# Patient Record
Sex: FEMALE | Race: WHITE | NOT HISPANIC OR LATINO | URBAN - METROPOLITAN AREA
[De-identification: names, ages, dates, MRNs, and addresses within clinical notes are randomized per-mention and may not be internally consistent; named-entity substitution may affect disease eponyms.]

---

## 2017-03-05 ENCOUNTER — EMERGENCY (EMERGENCY)
Facility: HOSPITAL | Age: 52
LOS: 0 days | Discharge: HOME | End: 2017-03-06
Attending: EMERGENCY MEDICINE
Payer: COMMERCIAL

## 2017-03-05 VITALS
TEMPERATURE: 97 F | DIASTOLIC BLOOD PRESSURE: 63 MMHG | SYSTOLIC BLOOD PRESSURE: 110 MMHG | HEIGHT: 64 IN | OXYGEN SATURATION: 100 % | RESPIRATION RATE: 17 BRPM | HEART RATE: 67 BPM | WEIGHT: 160.06 LBS

## 2017-03-05 DIAGNOSIS — S93.602A UNSPECIFIED SPRAIN OF LEFT FOOT, INITIAL ENCOUNTER: ICD-10-CM

## 2017-03-05 DIAGNOSIS — N39.0 URINARY TRACT INFECTION, SITE NOT SPECIFIED: ICD-10-CM
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
## 2017-03-05 DIAGNOSIS — R55 SYNCOPE AND COLLAPSE: ICD-10-CM

## 2017-03-05 DIAGNOSIS — I34.1 NONRHEUMATIC MITRAL (VALVE) PROLAPSE: ICD-10-CM
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
## 2017-03-05 LAB
ALBUMIN SERPL ELPH-MCNC: 4 G/DL — SIGNIFICANT CHANGE UP (ref 3.3–5)
ALP SERPL-CCNC: 47 U/L — SIGNIFICANT CHANGE UP (ref 40–120)
ALT FLD-CCNC: 21 U/L — SIGNIFICANT CHANGE UP (ref 12–78)
ANION GAP SERPL CALC-SCNC: 10 MMOL/L — SIGNIFICANT CHANGE UP (ref 5–17)
APTT BLD: 31.6 SEC — SIGNIFICANT CHANGE UP (ref 27.5–37.4)
AST SERPL-CCNC: 15 U/L — SIGNIFICANT CHANGE UP (ref 15–37)
BASOPHILS # BLD AUTO: 0.1 K/UL — SIGNIFICANT CHANGE UP (ref 0–0.2)
BASOPHILS NFR BLD AUTO: 0.9 % — SIGNIFICANT CHANGE UP (ref 0–2)
BILIRUB SERPL-MCNC: 0.4 MG/DL — SIGNIFICANT CHANGE UP (ref 0.2–1.2)
BUN SERPL-MCNC: 17 MG/DL — SIGNIFICANT CHANGE UP (ref 7–23)
CALCIUM SERPL-MCNC: 8.9 MG/DL — SIGNIFICANT CHANGE UP (ref 8.5–10.1)
CHLORIDE SERPL-SCNC: 102 MMOL/L — SIGNIFICANT CHANGE UP (ref 96–108)
CK MB BLD-MCNC: 1 % — SIGNIFICANT CHANGE UP (ref 0–3.5)
CK MB CFR SERPL CALC: 1.4 NG/ML — SIGNIFICANT CHANGE UP (ref 0.5–3.6)
CK SERPL-CCNC: 145 U/L — SIGNIFICANT CHANGE UP (ref 26–192)
CO2 SERPL-SCNC: 29 MMOL/L — SIGNIFICANT CHANGE UP (ref 22–31)
CREAT SERPL-MCNC: 0.84 MG/DL — SIGNIFICANT CHANGE UP (ref 0.5–1.3)
EOSINOPHIL # BLD AUTO: 0.1 K/UL — SIGNIFICANT CHANGE UP (ref 0–0.5)
EOSINOPHIL NFR BLD AUTO: 1 % — SIGNIFICANT CHANGE UP (ref 0–6)
GLUCOSE SERPL-MCNC: 102 MG/DL — HIGH (ref 70–99)
HCT VFR BLD CALC: 37.7 % — SIGNIFICANT CHANGE UP (ref 34.5–45)
HGB BLD-MCNC: 13.3 G/DL — SIGNIFICANT CHANGE UP (ref 11.5–15.5)
INR BLD: 1.11 RATIO — SIGNIFICANT CHANGE UP (ref 0.88–1.16)
LYMPHOCYTES # BLD AUTO: 3.5 K/UL — HIGH (ref 1–3.3)
LYMPHOCYTES # BLD AUTO: 38.5 % — SIGNIFICANT CHANGE UP (ref 13–44)
MCHC RBC-ENTMCNC: 30.1 PG — SIGNIFICANT CHANGE UP (ref 27–34)
MCHC RBC-ENTMCNC: 35.2 GM/DL — SIGNIFICANT CHANGE UP (ref 32–36)
MCV RBC AUTO: 85.5 FL — SIGNIFICANT CHANGE UP (ref 80–100)
MONOCYTES # BLD AUTO: 0.7 K/UL — SIGNIFICANT CHANGE UP (ref 0–0.9)
MONOCYTES NFR BLD AUTO: 7.7 % — SIGNIFICANT CHANGE UP (ref 2–14)
NEUTROPHILS # BLD AUTO: 4.7 K/UL — SIGNIFICANT CHANGE UP (ref 1.8–7.4)
NEUTROPHILS NFR BLD AUTO: 51.9 % — SIGNIFICANT CHANGE UP (ref 43–77)
PLATELET # BLD AUTO: 209 K/UL — SIGNIFICANT CHANGE UP (ref 150–400)
POTASSIUM SERPL-MCNC: 3.3 MMOL/L — LOW (ref 3.5–5.3)
POTASSIUM SERPL-SCNC: 3.3 MMOL/L — LOW (ref 3.5–5.3)
PROT SERPL-MCNC: 7.2 GM/DL — SIGNIFICANT CHANGE UP (ref 6–8.3)
PROTHROM AB SERPL-ACNC: 12.5 SEC — SIGNIFICANT CHANGE UP (ref 10–13.1)
RBC # BLD: 4.41 M/UL — SIGNIFICANT CHANGE UP (ref 3.8–5.2)
RBC # FLD: 11.1 % — SIGNIFICANT CHANGE UP (ref 11–15)
SODIUM SERPL-SCNC: 141 MMOL/L — SIGNIFICANT CHANGE UP (ref 135–145)
TROPONIN I SERPL-MCNC: <.015 NG/ML — SIGNIFICANT CHANGE UP (ref 0.01–0.04)
WBC # BLD: 9.1 K/UL — SIGNIFICANT CHANGE UP (ref 3.8–10.5)
WBC # FLD AUTO: 9.1 K/UL — SIGNIFICANT CHANGE UP (ref 3.8–10.5)

## 2017-03-05 PROCEDURE — 99285 EMERGENCY DEPT VISIT HI MDM: CPT | Mod: 25

## 2017-03-05 PROCEDURE — 73630 X-RAY EXAM OF FOOT: CPT | Mod: 26,LT

## 2017-03-05 RX ORDER — ONDANSETRON 8 MG/1
4 TABLET, FILM COATED ORAL ONCE
Qty: 0 | Refills: 0 | Status: COMPLETED | OUTPATIENT
Start: 2017-03-05 | End: 2017-03-05

## 2017-03-05 RX ORDER — SODIUM CHLORIDE 9 MG/ML
1000 INJECTION INTRAMUSCULAR; INTRAVENOUS; SUBCUTANEOUS ONCE
Qty: 0 | Refills: 0 | Status: COMPLETED | OUTPATIENT
Start: 2017-03-05 | End: 2017-03-05

## 2017-03-05 RX ADMIN — SODIUM CHLORIDE 1000 MILLILITER(S): 9 INJECTION INTRAMUSCULAR; INTRAVENOUS; SUBCUTANEOUS at 23:14

## 2017-03-05 RX ADMIN — ONDANSETRON 4 MILLIGRAM(S): 8 TABLET, FILM COATED ORAL at 23:14

## 2017-03-05 NOTE — ED PROVIDER NOTE - PHYSICAL EXAMINATION
Gen: Alert, NAD, speaking in complete sentences;  Head: NC, AT, PERRL, EOMI, normal lids/conjunctiva, no nystagmus;  ENT: normal hearing, patent oropharynx, MMM;  Neck: supple, no tenderness/meningismus, FROM;  Pulm: Bilateral clear BS, normal resp effort, no wheeze/stridor/retractions;  CV: RRR, +murmur, +dist pulses;  Abd: soft, NT/ND, +BS, no guarding/rebound tenderness;  Mskel: no edema/erythema/cyanosis, motor 5/5 and equal in upper & lower ext bilaterally, sensation intact, L foot TTP over dorsal aspect proximal to 3rd-5th digits, FROM in toes/foot/ankle, no swelling, DP+2, sensation intact;  Skin: no rash;  Neuro: AAOx3, no sensory/motor deficits, CN 2-12 intact

## 2017-03-05 NOTE — ED PROVIDER NOTE - CARE PLAN
Principal Discharge DX:	Syncope  Secondary Diagnosis:	Foot sprain  Secondary Diagnosis:	UTI (urinary tract infection)

## 2017-03-05 NOTE — ED PROVIDER NOTE - MEDICAL DECISION MAKING DETAILS
Pt VSS in NAD.  Pt reports sx improved after receiving IVF.  Imaging & labs neg for acute pathology.  Discussed results and outcome of testing with the patient, given copy as well.  Patient advised to please follow up with their primary care doctor within the next 24 hours and return to the Emergency Department for worsening symptoms or any other concerns.  Patient advised that their doctor may call  to follow up on the specific results of the tests performed today in the emergency department.

## 2017-03-05 NOTE — ED ADULT NURSE NOTE - OBJECTIVE STATEMENT
pt [place] ED [place] at airport and left ankle injury, pt states people witnessed here and though she might have been having a seizure

## 2017-03-05 NOTE — ED PROVIDER NOTE - OBJECTIVE STATEMENT
Pertinent PMH/PSH/FHx/SHx and Review of Systems contained within:    50yo F w PMH of mitral valve prolapse, uterine fibroids s/p myomectomy & tubal ligation, prev L foot fx, orthostasis, former smoker currently on nicotine patches & gum presents to ED for eval of dizziness & s/p episode of syncope.  Pt states she has also been dieting, and only had an apple earlier.  Pt states she was in airport, waiting to board plane when she stood up and injured L foot.  Pt states she "heard it crack a few times."  Pt then felt lightheaded, and dizzy.  Pt sat in chair, told co-worker she felt like she was going to pass out, then fainted in chair.  Co-worker reported ?tonic-clonic activity, no incontinence.  Pt denies CP, SOB, abd pain.  +nausea, no vomiting.     No fever/chills, No photophobia/eye pain/changes in vision, No ear pain/sore throat/dysphagia, No chest pain/palpitations, no SOB/cough/wheeze/stridor, no dysuria/frequency/discharge, No neck/back pain, no rash

## 2017-03-06 VITALS
OXYGEN SATURATION: 99 % | TEMPERATURE: 97 F | RESPIRATION RATE: 17 BRPM | HEART RATE: 62 BPM | DIASTOLIC BLOOD PRESSURE: 36 MMHG | SYSTOLIC BLOOD PRESSURE: 109 MMHG

## 2017-03-06 LAB
APPEARANCE UR: CLEAR — SIGNIFICANT CHANGE UP
BACTERIA # UR AUTO: ABNORMAL
BILIRUB UR-MCNC: NEGATIVE — SIGNIFICANT CHANGE UP
CK MB BLD-MCNC: 1 % — SIGNIFICANT CHANGE UP (ref 0–3.5)
CK MB CFR SERPL CALC: 1.2 NG/ML — SIGNIFICANT CHANGE UP (ref 0.5–3.6)
CK SERPL-CCNC: 122 U/L — SIGNIFICANT CHANGE UP (ref 26–192)
COLOR SPEC: YELLOW — SIGNIFICANT CHANGE UP
COMMENT - URINE: SIGNIFICANT CHANGE UP
DIFF PNL FLD: ABNORMAL
EPI CELLS # UR: SIGNIFICANT CHANGE UP
GLUCOSE UR QL: NEGATIVE MG/DL — SIGNIFICANT CHANGE UP
KETONES UR-MCNC: ABNORMAL
LEUKOCYTE ESTERASE UR-ACNC: ABNORMAL
NITRITE UR-MCNC: NEGATIVE — SIGNIFICANT CHANGE UP
PH UR: 5 — SIGNIFICANT CHANGE UP (ref 4.8–8)
PROT UR-MCNC: 30 MG/DL
RBC CASTS # UR COMP ASSIST: SIGNIFICANT CHANGE UP /HPF (ref 0–4)
SP GR SPEC: 1.02 — SIGNIFICANT CHANGE UP (ref 1.01–1.02)
TROPONIN I SERPL-MCNC: <.015 NG/ML — SIGNIFICANT CHANGE UP (ref 0.01–0.04)
UROBILINOGEN FLD QL: NEGATIVE MG/DL — SIGNIFICANT CHANGE UP
WBC UR QL: SIGNIFICANT CHANGE UP

## 2017-03-06 PROCEDURE — 70450 CT HEAD/BRAIN W/O DYE: CPT | Mod: 26

## 2017-03-06 RX ORDER — NITROFURANTOIN MACROCRYSTAL 50 MG
1 CAPSULE ORAL
Qty: 20 | Refills: 0 | OUTPATIENT
Start: 2017-03-06 | End: 2017-03-16

## 2017-03-06 RX ORDER — POTASSIUM CHLORIDE 20 MEQ
20 PACKET (EA) ORAL ONCE
Qty: 0 | Refills: 0 | Status: COMPLETED | OUTPATIENT
Start: 2017-03-06 | End: 2017-03-06

## 2017-03-06 RX ADMIN — Medication 20 MILLIEQUIVALENT(S): at 01:05

## 2017-06-26 ENCOUNTER — APPOINTMENT (OUTPATIENT)
Dept: ULTRASOUND IMAGING | Facility: IMAGING CENTER | Age: 52
End: 2017-06-26

## 2017-06-26 ENCOUNTER — OUTPATIENT (OUTPATIENT)
Dept: OUTPATIENT SERVICES | Facility: HOSPITAL | Age: 52
LOS: 1 days | End: 2017-06-26
Payer: COMMERCIAL

## 2017-06-26 DIAGNOSIS — Z00.8 ENCOUNTER FOR OTHER GENERAL EXAMINATION: ICD-10-CM

## 2017-06-26 PROCEDURE — 76830 TRANSVAGINAL US NON-OB: CPT

## 2017-06-26 PROCEDURE — 76856 US EXAM PELVIC COMPLETE: CPT

## 2017-07-01 ENCOUNTER — APPOINTMENT (OUTPATIENT)
Dept: MAMMOGRAPHY | Facility: IMAGING CENTER | Age: 52
End: 2017-07-01

## 2017-12-27 ENCOUNTER — OUTPATIENT (OUTPATIENT)
Dept: OUTPATIENT SERVICES | Facility: HOSPITAL | Age: 52
LOS: 1 days | End: 2017-12-27
Payer: COMMERCIAL

## 2017-12-27 ENCOUNTER — APPOINTMENT (OUTPATIENT)
Dept: MAMMOGRAPHY | Facility: CLINIC | Age: 52
End: 2017-12-27
Payer: COMMERCIAL

## 2017-12-27 DIAGNOSIS — Z00.8 ENCOUNTER FOR OTHER GENERAL EXAMINATION: ICD-10-CM

## 2017-12-27 PROCEDURE — G0202: CPT | Mod: 26

## 2017-12-27 PROCEDURE — 77063 BREAST TOMOSYNTHESIS BI: CPT | Mod: 26

## 2017-12-27 PROCEDURE — 77067 SCR MAMMO BI INCL CAD: CPT

## 2017-12-27 PROCEDURE — 77063 BREAST TOMOSYNTHESIS BI: CPT

## 2018-02-21 ENCOUNTER — OUTPATIENT (OUTPATIENT)
Dept: OUTPATIENT SERVICES | Facility: HOSPITAL | Age: 53
LOS: 1 days | End: 2018-02-21
Payer: COMMERCIAL

## 2018-02-21 ENCOUNTER — APPOINTMENT (OUTPATIENT)
Dept: CT IMAGING | Facility: CLINIC | Age: 53
End: 2018-02-21
Payer: COMMERCIAL

## 2018-02-21 DIAGNOSIS — Z00.8 ENCOUNTER FOR OTHER GENERAL EXAMINATION: ICD-10-CM

## 2018-02-21 PROCEDURE — 74177 CT ABD & PELVIS W/CONTRAST: CPT | Mod: 26

## 2018-02-21 PROCEDURE — 74177 CT ABD & PELVIS W/CONTRAST: CPT

## 2018-11-13 PROBLEM — R55 SYNCOPE AND COLLAPSE: Chronic | Status: ACTIVE | Noted: 2017-03-05

## 2018-11-13 PROBLEM — I34.1 NONRHEUMATIC MITRAL (VALVE) PROLAPSE: Chronic | Status: ACTIVE | Noted: 2017-03-05

## 2018-11-16 ENCOUNTER — OUTPATIENT (OUTPATIENT)
Dept: OUTPATIENT SERVICES | Facility: HOSPITAL | Age: 53
LOS: 1 days | End: 2018-11-16
Payer: COMMERCIAL

## 2018-11-16 ENCOUNTER — APPOINTMENT (OUTPATIENT)
Dept: RADIOLOGY | Facility: CLINIC | Age: 53
End: 2018-11-16
Payer: COMMERCIAL

## 2018-11-16 DIAGNOSIS — Z12.31 ENCOUNTER FOR SCREENING MAMMOGRAM FOR MALIGNANT NEOPLASM OF BREAST: ICD-10-CM

## 2018-11-16 DIAGNOSIS — Z00.8 ENCOUNTER FOR OTHER GENERAL EXAMINATION: ICD-10-CM

## 2018-11-16 PROCEDURE — 77080 DXA BONE DENSITY AXIAL: CPT | Mod: 26

## 2018-11-16 PROCEDURE — 77080 DXA BONE DENSITY AXIAL: CPT

## 2019-01-03 ENCOUNTER — OUTPATIENT (OUTPATIENT)
Dept: OUTPATIENT SERVICES | Facility: HOSPITAL | Age: 54
LOS: 1 days | End: 2019-01-03
Payer: COMMERCIAL

## 2019-01-03 ENCOUNTER — APPOINTMENT (OUTPATIENT)
Dept: MAMMOGRAPHY | Facility: CLINIC | Age: 54
End: 2019-01-03
Payer: COMMERCIAL

## 2019-01-03 DIAGNOSIS — Z00.8 ENCOUNTER FOR OTHER GENERAL EXAMINATION: ICD-10-CM

## 2019-01-03 PROCEDURE — 77067 SCR MAMMO BI INCL CAD: CPT

## 2019-01-03 PROCEDURE — 77063 BREAST TOMOSYNTHESIS BI: CPT | Mod: 26

## 2019-01-03 PROCEDURE — 77063 BREAST TOMOSYNTHESIS BI: CPT

## 2019-01-03 PROCEDURE — 77067 SCR MAMMO BI INCL CAD: CPT | Mod: 26

## 2019-04-27 ENCOUNTER — EMERGENCY (EMERGENCY)
Facility: HOSPITAL | Age: 54
LOS: 0 days | Discharge: ANOTHER HOSPITAL | End: 2019-04-28
Attending: EMERGENCY MEDICINE
Payer: COMMERCIAL

## 2019-04-27 VITALS
HEART RATE: 54 BPM | HEIGHT: 64 IN | RESPIRATION RATE: 17 BRPM | SYSTOLIC BLOOD PRESSURE: 122 MMHG | TEMPERATURE: 98 F | WEIGHT: 154.1 LBS | OXYGEN SATURATION: 100 % | DIASTOLIC BLOOD PRESSURE: 74 MMHG

## 2019-04-27 DIAGNOSIS — R42 DIZZINESS AND GIDDINESS: ICD-10-CM

## 2019-04-27 DIAGNOSIS — R00.1 BRADYCARDIA, UNSPECIFIED: ICD-10-CM

## 2019-04-27 LAB
ALBUMIN SERPL ELPH-MCNC: 3.5 G/DL — SIGNIFICANT CHANGE UP (ref 3.3–5)
ALP SERPL-CCNC: 54 U/L — SIGNIFICANT CHANGE UP (ref 40–120)
ALT FLD-CCNC: 25 U/L — SIGNIFICANT CHANGE UP (ref 12–78)
ANION GAP SERPL CALC-SCNC: 8 MMOL/L — SIGNIFICANT CHANGE UP (ref 5–17)
APPEARANCE UR: CLEAR — SIGNIFICANT CHANGE UP
APTT BLD: 36.1 SEC — SIGNIFICANT CHANGE UP (ref 27.5–36.3)
AST SERPL-CCNC: 13 U/L — LOW (ref 15–37)
BACTERIA # UR AUTO: ABNORMAL
BILIRUB SERPL-MCNC: 0.3 MG/DL — SIGNIFICANT CHANGE UP (ref 0.2–1.2)
BILIRUB UR-MCNC: NEGATIVE — SIGNIFICANT CHANGE UP
BUN SERPL-MCNC: 15 MG/DL — SIGNIFICANT CHANGE UP (ref 7–23)
CALCIUM SERPL-MCNC: 9 MG/DL — SIGNIFICANT CHANGE UP (ref 8.5–10.1)
CHLORIDE SERPL-SCNC: 104 MMOL/L — SIGNIFICANT CHANGE UP (ref 96–108)
CO2 SERPL-SCNC: 28 MMOL/L — SIGNIFICANT CHANGE UP (ref 22–31)
COD CRY URNS QL: ABNORMAL
COLOR SPEC: YELLOW — SIGNIFICANT CHANGE UP
CREAT SERPL-MCNC: 0.71 MG/DL — SIGNIFICANT CHANGE UP (ref 0.5–1.3)
DIFF PNL FLD: ABNORMAL
EPI CELLS # UR: ABNORMAL
GLUCOSE BLDC GLUCOMTR-MCNC: 80 MG/DL — SIGNIFICANT CHANGE UP (ref 70–99)
GLUCOSE SERPL-MCNC: 89 MG/DL — SIGNIFICANT CHANGE UP (ref 70–99)
GLUCOSE UR QL: NEGATIVE MG/DL — SIGNIFICANT CHANGE UP
HCG SERPL-ACNC: 2 MIU/ML — SIGNIFICANT CHANGE UP
HCT VFR BLD CALC: 38.7 % — SIGNIFICANT CHANGE UP (ref 34.5–45)
HGB BLD-MCNC: 13 G/DL — SIGNIFICANT CHANGE UP (ref 11.5–15.5)
INR BLD: 0.96 RATIO — SIGNIFICANT CHANGE UP (ref 0.88–1.16)
KETONES UR-MCNC: NEGATIVE — SIGNIFICANT CHANGE UP
LEUKOCYTE ESTERASE UR-ACNC: ABNORMAL
LIDOCAIN IGE QN: 202 U/L — SIGNIFICANT CHANGE UP (ref 73–393)
MCHC RBC-ENTMCNC: 29.9 PG — SIGNIFICANT CHANGE UP (ref 27–34)
MCHC RBC-ENTMCNC: 33.6 GM/DL — SIGNIFICANT CHANGE UP (ref 32–36)
MCV RBC AUTO: 89 FL — SIGNIFICANT CHANGE UP (ref 80–100)
NITRITE UR-MCNC: NEGATIVE — SIGNIFICANT CHANGE UP
NRBC # BLD: 0 /100 WBCS — SIGNIFICANT CHANGE UP (ref 0–0)
NT-PROBNP SERPL-SCNC: 53 PG/ML — SIGNIFICANT CHANGE UP (ref 0–125)
PH UR: 5 — SIGNIFICANT CHANGE UP (ref 5–8)
PLATELET # BLD AUTO: 203 K/UL — SIGNIFICANT CHANGE UP (ref 150–400)
POTASSIUM SERPL-MCNC: 3.6 MMOL/L — SIGNIFICANT CHANGE UP (ref 3.5–5.3)
POTASSIUM SERPL-SCNC: 3.6 MMOL/L — SIGNIFICANT CHANGE UP (ref 3.5–5.3)
PROT SERPL-MCNC: 6.6 GM/DL — SIGNIFICANT CHANGE UP (ref 6–8.3)
PROT UR-MCNC: NEGATIVE MG/DL — SIGNIFICANT CHANGE UP
PROTHROM AB SERPL-ACNC: 10.7 SEC — SIGNIFICANT CHANGE UP (ref 10–12.9)
RBC # BLD: 4.35 M/UL — SIGNIFICANT CHANGE UP (ref 3.8–5.2)
RBC # FLD: 12.9 % — SIGNIFICANT CHANGE UP (ref 10.3–14.5)
RBC CASTS # UR COMP ASSIST: SIGNIFICANT CHANGE UP /HPF (ref 0–4)
SODIUM SERPL-SCNC: 140 MMOL/L — SIGNIFICANT CHANGE UP (ref 135–145)
SP GR SPEC: 1.02 — SIGNIFICANT CHANGE UP (ref 1.01–1.02)
TROPONIN I SERPL-MCNC: <.015 NG/ML — SIGNIFICANT CHANGE UP (ref 0.01–0.04)
UROBILINOGEN FLD QL: NEGATIVE MG/DL — SIGNIFICANT CHANGE UP
WBC # BLD: 7.53 K/UL — SIGNIFICANT CHANGE UP (ref 3.8–10.5)
WBC # FLD AUTO: 7.53 K/UL — SIGNIFICANT CHANGE UP (ref 3.8–10.5)
WBC UR QL: SIGNIFICANT CHANGE UP

## 2019-04-27 PROCEDURE — 71045 X-RAY EXAM CHEST 1 VIEW: CPT | Mod: 26

## 2019-04-27 PROCEDURE — 99285 EMERGENCY DEPT VISIT HI MDM: CPT

## 2019-04-27 PROCEDURE — 93010 ELECTROCARDIOGRAM REPORT: CPT

## 2019-04-27 PROCEDURE — 70450 CT HEAD/BRAIN W/O DYE: CPT | Mod: 26

## 2019-04-27 RX ORDER — MECLIZINE HCL 12.5 MG
25 TABLET ORAL ONCE
Qty: 0 | Refills: 0 | Status: COMPLETED | OUTPATIENT
Start: 2019-04-27 | End: 2019-04-27

## 2019-04-27 RX ORDER — SODIUM CHLORIDE 9 MG/ML
1000 INJECTION INTRAMUSCULAR; INTRAVENOUS; SUBCUTANEOUS ONCE
Qty: 0 | Refills: 0 | Status: COMPLETED | OUTPATIENT
Start: 2019-04-27 | End: 2019-04-27

## 2019-04-27 RX ADMIN — SODIUM CHLORIDE 1000 MILLILITER(S): 9 INJECTION INTRAMUSCULAR; INTRAVENOUS; SUBCUTANEOUS at 21:10

## 2019-04-27 RX ADMIN — Medication 25 MILLIGRAM(S): at 21:10

## 2019-04-27 NOTE — ED PROVIDER NOTE - NSRISKOFTRANSFER_ED_A_ED
Deterioration of Condition En Route/Transportation Risk (There is always a risk of traffic delays resulting in deterioration of condition.)/Death or Disability

## 2019-04-27 NOTE — ED PROVIDER NOTE - CLINICAL SUMMARY MEDICAL DECISION MAKING FREE TEXT BOX
Ddx: Symptomatic bradycardia/ vertigo, but negative lucía hallpike/ near syncope  Plan: Cbc, cmp, lipase, bnp, cxr, troponin, ct head, reassess

## 2019-04-27 NOTE — ED ADULT NURSE NOTE - NSIMPLEMENTINTERV_GEN_ALL_ED
Implemented All Universal Safety Interventions:  [place] to call system. Call bell, personal items and telephone within reach. Instruct patient to call for assistance. Room bathroom lighting operational. Non-slip footwear when patient is off stretcher. Physically safe environment: no spills, clutter or unnecessary equipment. Stretcher in lowest position, wheels locked, appropriate side rails in place.

## 2019-04-27 NOTE — ED PROVIDER NOTE - OBJECTIVE STATEMENT
Pt is a 52 yo lady with a pmhx of mitral valve prolapse who presents to the ED with near syncope. This started yesterday. Gets light headed, and feels like she may pass out and then it resolves. No nausea. Intermittently says she feels spinning sensation. No viral symptoms recently. No chest pain, no sob, no n/v/d.

## 2019-04-27 NOTE — ED PROVIDER NOTE - CRANIAL NERVE AND PUPILLARY EXAM
cranial nerves 2-12 intact/no dysmetria, normal finger to nose. No nystagmus, negative Moose Hallpike

## 2019-04-27 NOTE — ED ADULT NURSE REASSESSMENT NOTE - NS ED NURSE REASSESS COMMENT FT1
pt resting in bed. witnessed pt eat food. pt complained of onset of dizziness, with heartrate of 53. Dr. Cade made aware. family at bedside

## 2019-04-27 NOTE — ED PROVIDER NOTE - PROGRESS NOTE DETAILS
Pt gets symptomatic bradycardic. Lasts for 20 seconds, and then resolves. On pacer pads. Cardiology consult accepts for transfer.

## 2019-04-28 ENCOUNTER — INPATIENT (INPATIENT)
Facility: HOSPITAL | Age: 54
LOS: 2 days | Discharge: HOME | DRG: 287 | End: 2019-05-01
Attending: STUDENT IN AN ORGANIZED HEALTH CARE EDUCATION/TRAINING PROGRAM | Admitting: INTERNAL MEDICINE
Payer: COMMERCIAL

## 2019-04-28 VITALS
RESPIRATION RATE: 18 BRPM | SYSTOLIC BLOOD PRESSURE: 111 MMHG | TEMPERATURE: 98 F | DIASTOLIC BLOOD PRESSURE: 72 MMHG | WEIGHT: 154.98 LBS | HEIGHT: 64 IN | HEART RATE: 52 BPM

## 2019-04-28 VITALS
HEART RATE: 64 BPM | RESPIRATION RATE: 17 BRPM | SYSTOLIC BLOOD PRESSURE: 121 MMHG | DIASTOLIC BLOOD PRESSURE: 73 MMHG | OXYGEN SATURATION: 97 % | TEMPERATURE: 98 F

## 2019-04-28 DIAGNOSIS — R00.1 BRADYCARDIA, UNSPECIFIED: ICD-10-CM

## 2019-04-28 DIAGNOSIS — F17.200 NICOTINE DEPENDENCE, UNSPECIFIED, UNCOMPLICATED: ICD-10-CM

## 2019-04-28 DIAGNOSIS — Z29.9 ENCOUNTER FOR PROPHYLACTIC MEASURES, UNSPECIFIED: ICD-10-CM

## 2019-04-28 DIAGNOSIS — Z90.89 ACQUIRED ABSENCE OF OTHER ORGANS: Chronic | ICD-10-CM

## 2019-04-28 DIAGNOSIS — Z98.51 TUBAL LIGATION STATUS: Chronic | ICD-10-CM

## 2019-04-28 LAB
ALBUMIN SERPL ELPH-MCNC: 3.8 G/DL — SIGNIFICANT CHANGE UP (ref 3.3–5)
ALP SERPL-CCNC: 51 U/L — SIGNIFICANT CHANGE UP (ref 40–120)
ALT FLD-CCNC: 18 U/L — SIGNIFICANT CHANGE UP (ref 10–45)
ANION GAP SERPL CALC-SCNC: 11 MMOL/L — SIGNIFICANT CHANGE UP (ref 5–17)
APTT BLD: 32.7 SEC — SIGNIFICANT CHANGE UP (ref 27.5–36.3)
AST SERPL-CCNC: 14 U/L — SIGNIFICANT CHANGE UP (ref 10–40)
BASOPHILS # BLD AUTO: 0.1 K/UL — SIGNIFICANT CHANGE UP (ref 0–0.2)
BASOPHILS NFR BLD AUTO: 0.8 % — SIGNIFICANT CHANGE UP (ref 0–2)
BILIRUB SERPL-MCNC: 0.5 MG/DL — SIGNIFICANT CHANGE UP (ref 0.2–1.2)
BLD GP AB SCN SERPL QL: NEGATIVE — SIGNIFICANT CHANGE UP
BUN SERPL-MCNC: 10 MG/DL — SIGNIFICANT CHANGE UP (ref 7–23)
CALCIUM SERPL-MCNC: 9.2 MG/DL — SIGNIFICANT CHANGE UP (ref 8.4–10.5)
CHLORIDE SERPL-SCNC: 108 MMOL/L — SIGNIFICANT CHANGE UP (ref 96–108)
CHOLEST SERPL-MCNC: 185 MG/DL — SIGNIFICANT CHANGE UP (ref 10–199)
CK MB CFR SERPL CALC: 1.5 NG/ML — SIGNIFICANT CHANGE UP (ref 0–3.8)
CO2 SERPL-SCNC: 24 MMOL/L — SIGNIFICANT CHANGE UP (ref 22–31)
CORTIS AM PEAK SERPL-MCNC: 6.4 UG/DL — SIGNIFICANT CHANGE UP (ref 6–18.4)
CREAT SERPL-MCNC: 0.61 MG/DL — SIGNIFICANT CHANGE UP (ref 0.5–1.3)
EOSINOPHIL # BLD AUTO: 0.1 K/UL — SIGNIFICANT CHANGE UP (ref 0–0.5)
EOSINOPHIL NFR BLD AUTO: 2.2 % — SIGNIFICANT CHANGE UP (ref 0–6)
GLUCOSE SERPL-MCNC: 97 MG/DL — SIGNIFICANT CHANGE UP (ref 70–99)
HBA1C BLD-MCNC: 5.4 % — SIGNIFICANT CHANGE UP (ref 4–5.6)
HCG UR QL: NEGATIVE — SIGNIFICANT CHANGE UP
HCT VFR BLD CALC: 35.6 % — SIGNIFICANT CHANGE UP (ref 34.5–45)
HDLC SERPL-MCNC: 53 MG/DL — SIGNIFICANT CHANGE UP
HGB BLD-MCNC: 12.8 G/DL — SIGNIFICANT CHANGE UP (ref 11.5–15.5)
INR BLD: 1.01 RATIO — SIGNIFICANT CHANGE UP (ref 0.88–1.16)
LIPID PNL WITH DIRECT LDL SERPL: 114 MG/DL — HIGH
LYMPHOCYTES # BLD AUTO: 2.7 K/UL — SIGNIFICANT CHANGE UP (ref 1–3.3)
LYMPHOCYTES # BLD AUTO: 42.9 % — SIGNIFICANT CHANGE UP (ref 13–44)
MCHC RBC-ENTMCNC: 32.4 PG — SIGNIFICANT CHANGE UP (ref 27–34)
MCHC RBC-ENTMCNC: 36.1 GM/DL — HIGH (ref 32–36)
MCV RBC AUTO: 89.8 FL — SIGNIFICANT CHANGE UP (ref 80–100)
MONOCYTES # BLD AUTO: 0.5 K/UL — SIGNIFICANT CHANGE UP (ref 0–0.9)
MONOCYTES NFR BLD AUTO: 8.1 % — SIGNIFICANT CHANGE UP (ref 2–14)
NEUTROPHILS # BLD AUTO: 2.9 K/UL — SIGNIFICANT CHANGE UP (ref 1.8–7.4)
NEUTROPHILS NFR BLD AUTO: 46 % — SIGNIFICANT CHANGE UP (ref 43–77)
PLATELET # BLD AUTO: 177 K/UL — SIGNIFICANT CHANGE UP (ref 150–400)
POTASSIUM SERPL-MCNC: 3.8 MMOL/L — SIGNIFICANT CHANGE UP (ref 3.5–5.3)
POTASSIUM SERPL-SCNC: 3.8 MMOL/L — SIGNIFICANT CHANGE UP (ref 3.5–5.3)
PROT SERPL-MCNC: 6.1 G/DL — SIGNIFICANT CHANGE UP (ref 6–8.3)
PROTHROM AB SERPL-ACNC: 11.5 SEC — SIGNIFICANT CHANGE UP (ref 10–12.9)
RBC # BLD: 3.97 M/UL — SIGNIFICANT CHANGE UP (ref 3.8–5.2)
RBC # FLD: 12 % — SIGNIFICANT CHANGE UP (ref 10.3–14.5)
RH IG SCN BLD-IMP: POSITIVE — SIGNIFICANT CHANGE UP
SODIUM SERPL-SCNC: 143 MMOL/L — SIGNIFICANT CHANGE UP (ref 135–145)
TOTAL CHOLESTEROL/HDL RATIO MEASUREMENT: 3.5 RATIO — SIGNIFICANT CHANGE UP (ref 3.3–7.1)
TRIGL SERPL-MCNC: 89 MG/DL — SIGNIFICANT CHANGE UP (ref 10–149)
TROPONIN T, HIGH SENSITIVITY RESULT: <6 NG/L — SIGNIFICANT CHANGE UP (ref 0–51)
TSH SERPL-MCNC: 2.56 UIU/ML — SIGNIFICANT CHANGE UP (ref 0.27–4.2)
TSH SERPL-MCNC: 4.55 UIU/ML — HIGH (ref 0.27–4.2)
WBC # BLD: 6.2 K/UL — SIGNIFICANT CHANGE UP (ref 3.8–10.5)
WBC # FLD AUTO: 6.2 K/UL — SIGNIFICANT CHANGE UP (ref 3.8–10.5)

## 2019-04-28 PROCEDURE — 99291 CRITICAL CARE FIRST HOUR: CPT

## 2019-04-28 PROCEDURE — 99223 1ST HOSP IP/OBS HIGH 75: CPT | Mod: GC

## 2019-04-28 PROCEDURE — 93306 TTE W/DOPPLER COMPLETE: CPT | Mod: 26

## 2019-04-28 PROCEDURE — 93010 ELECTROCARDIOGRAM REPORT: CPT | Mod: 76

## 2019-04-28 RX ORDER — FLUTICASONE PROPIONATE 50 MCG
1 SPRAY, SUSPENSION NASAL DAILY
Qty: 0 | Refills: 0 | Status: DISCONTINUED | OUTPATIENT
Start: 2019-04-28 | End: 2019-05-01

## 2019-04-28 RX ORDER — FLUTICASONE PROPIONATE 50 MCG
1 SPRAY, SUSPENSION NASAL
Qty: 0 | Refills: 0 | COMMUNITY

## 2019-04-28 RX ORDER — CHLORHEXIDINE GLUCONATE 213 G/1000ML
1 SOLUTION TOPICAL
Qty: 0 | Refills: 0 | Status: DISCONTINUED | OUTPATIENT
Start: 2019-04-28 | End: 2019-05-01

## 2019-04-28 RX ORDER — HEPARIN SODIUM 5000 [USP'U]/ML
5000 INJECTION INTRAVENOUS; SUBCUTANEOUS EVERY 8 HOURS
Qty: 0 | Refills: 0 | Status: DISCONTINUED | OUTPATIENT
Start: 2019-04-28 | End: 2019-04-30

## 2019-04-28 RX ORDER — HEPARIN SODIUM 5000 [USP'U]/ML
5000 INJECTION INTRAVENOUS; SUBCUTANEOUS EVERY 8 HOURS
Qty: 0 | Refills: 0 | Status: DISCONTINUED | OUTPATIENT
Start: 2019-04-28 | End: 2019-04-28

## 2019-04-28 RX ORDER — NICOTINE POLACRILEX 2 MG
0 GUM BUCCAL
Qty: 0 | Refills: 0 | COMMUNITY

## 2019-04-28 RX ORDER — DEXTROAMPHETAMINE SACCHARATE, AMPHETAMINE ASPARTATE, DEXTROAMPHETAMINE SULFATE AND AMPHETAMINE SULFATE 1.875; 1.875; 1.875; 1.875 MG/1; MG/1; MG/1; MG/1
2 TABLET ORAL
Qty: 0 | Refills: 0 | COMMUNITY

## 2019-04-28 RX ORDER — DEXTROAMPHETAMINE SACCHARATE, AMPHETAMINE ASPARTATE, DEXTROAMPHETAMINE SULFATE AND AMPHETAMINE SULFATE 1.875; 1.875; 1.875; 1.875 MG/1; MG/1; MG/1; MG/1
10 TABLET ORAL
Qty: 0 | Refills: 0 | Status: DISCONTINUED | OUTPATIENT
Start: 2019-04-28 | End: 2019-05-01

## 2019-04-28 RX ORDER — NICOTINE POLACRILEX 2 MG
1 GUM BUCCAL
Qty: 0 | Refills: 0 | COMMUNITY

## 2019-04-28 RX ORDER — DOPAMINE HYDROCHLORIDE 40 MG/ML
5 INJECTION, SOLUTION, CONCENTRATE INTRAVENOUS
Qty: 400 | Refills: 0 | Status: DISCONTINUED | OUTPATIENT
Start: 2019-04-28 | End: 2019-04-29

## 2019-04-28 RX ORDER — DEXTROAMPHETAMINE SACCHARATE, AMPHETAMINE ASPARTATE, DEXTROAMPHETAMINE SULFATE AND AMPHETAMINE SULFATE 1.875; 1.875; 1.875; 1.875 MG/1; MG/1; MG/1; MG/1
1 TABLET ORAL
Qty: 0 | Refills: 0 | COMMUNITY

## 2019-04-28 RX ORDER — ACETAMINOPHEN 500 MG
650 TABLET ORAL EVERY 6 HOURS
Qty: 0 | Refills: 0 | Status: DISCONTINUED | OUTPATIENT
Start: 2019-04-28 | End: 2019-05-01

## 2019-04-28 RX ORDER — AZELASTINE 137 UG/1
2 SPRAY, METERED NASAL
Qty: 0 | Refills: 0 | COMMUNITY

## 2019-04-28 RX ADMIN — DOPAMINE HYDROCHLORIDE 13.18 MICROGRAM(S)/KG/MIN: 40 INJECTION, SOLUTION, CONCENTRATE INTRAVENOUS at 03:36

## 2019-04-28 RX ADMIN — HEPARIN SODIUM 5000 UNIT(S): 5000 INJECTION INTRAVENOUS; SUBCUTANEOUS at 20:17

## 2019-04-28 RX ADMIN — DOPAMINE HYDROCHLORIDE 13.18 MICROGRAM(S)/KG/MIN: 40 INJECTION, SOLUTION, CONCENTRATE INTRAVENOUS at 21:17

## 2019-04-28 NOTE — CONSULT NOTE ADULT - ATTENDING COMMENTS
Agree with above fellow's note.    Symptomatic sinus pauses. Their appearance on telemetry is not typical of a vagal etiology though I remain suspicious of this diagnosis given her recent good health and exercise tolerance (3 miles running, 3x/week) and long history of more typical vasovagal syncope dating back to her 20s.   For now, recommend:  - TTE for structural evaluation  - Ischemic eval to rule out proximal RCA disease (she is a lifetime smoker)  - Continue dopamine for time being.  - If episodes continue without an identifiable source over next 48 hours, pacing may be warranted.  - Will follow.

## 2019-04-28 NOTE — PROGRESS NOTE ADULT - ATTENDING COMMENTS
Patient with symptomatic bradycardia requiring dopamine. Episodes described as vagal in nature in the setting of EtOH at a wedding.  No obvious offending agents in her medications list as Adderall is more likely to cause tachycardia than bradycardia.    Continue dopamine for symptomatic relief and HR support.    EP consult.    Amish Garces MD made aware of patient's transfer.    I have personally spent 30 minutes of critical care time excluding time spent on separate procedures.

## 2019-04-28 NOTE — ED PROVIDER NOTE - OBJECTIVE STATEMENT
53 YOF pmh MVP transfer from  with symptomatic bradycardia into 30s. Pt denies fever, chills, cough, chest pain. +lightheadedness and near syncopal episodes x 2 times today. Pt recently traveled to [facility]. No b-blocker or ccb.

## 2019-04-28 NOTE — H&P ADULT - NSICDXPASTMEDICALHX_GEN_ALL_CORE_FT
PAST MEDICAL HISTORY:  Fainting     MVP (mitral valve prolapse)     Smoker PAST MEDICAL HISTORY:  ADD (attention deficit disorder)     Anxiety     Fainting     Former smoker     MVP (mitral valve prolapse)     Seasonal allergies

## 2019-04-28 NOTE — H&P ADULT - HISTORY OF PRESENT ILLNESS
This is a 53 year old female current smoker with past medical history of mitral valve prolapse, who was transferred from [facility] with symptomatic bradycardia. This is a 53 year old female current smoker with past medical history of mitral valve prolapse, who was transferred from [facility] with symptomatic bradycardia.  Patient complaints of dizziness x  1 day which prompted to her to go to ED.  Found to be in severe bradycardia with rates in 30s.  Transferred to [facility] for further management.  Patient started on Dopamine gtt at 5mcg with appropriate  response in HR.  Denies any syncopal events.  Denies AVN blockers.  Had recent travels outside of country.  Currently denies dizziness, lightheadedness, chest pain, palpitations, sob, abd pain, n/v/d/c. This is a 53 year old female former smoker with past medical history of mitral valve prolapse, anxiety and attention deficit disorder who was transferred from [facility] ED for symptomatic bradycardia.  Patient states Friday night she was at a wedding and felt episodes of dizziness.  When she awoke the next day she felt extreme weakness with an episode of severe dizziness which prompted her to go to the ED.  Found to be bradycardic in 30s.  Transferred to [facility] for further management.  Patient started on Dopamine gtt at 5mcg with appropriate response in HR.  Denies any syncopy.  Denies using any AVN blockers.  Currently denies dizziness, lightheadedness, chest pain, palpitations, sob, abd pain, n/v/d/c.  Of note, patient also states she runs about 5 miles 3-5x per week.  This week she states she was unable to run because she felt weakness with episodes of palpitations.  Patient endorses she has had episodes of syncopal events in the past due to dehydration. This is a 53 year old female former smoker with past medical history of mitral valve prolapse, anxiety and attention deficit disorder who was transferred from [facility] ED for symptomatic bradycardia.  Patient states Friday night she was at a wedding and felt episodes of dizziness.  She endorses she drank 4 glasses of wine.  When she awoke the next day she felt extreme weakness with an episode of sustained dizziness which prompted her to go to the ED.  While in ED, she was found to be bradycardic in 30s.  Patient transferred to [facility] for further management.  Dopamine gtt at 5mcg was started with appropriate response in HR.  Patient denies any syncope.  No history of AVN blockers. Currently patient denies dizziness, lightheadedness, chest pain, palpitations, sob, abd pain, n/v/d/c.  Of note, patient states she runs about 5 miles 3-5x per week.  This week she states she was unable to run because she felt weakness with episodes of palpitations.  Patient also endorses having prior syncopal episodes in the past with ER visit for dehydration.

## 2019-04-28 NOTE — ED ADULT NURSE NOTE - OBJECTIVE STATEMENT
52 y/o female with PMH anxiety BIBEMS transfer from [place] for bradycardia. As per EMS: "She was feeling dizzy and weak since today. Went to [place] they found her to be bradycardic. They sent her here for a cardiac eval." Pt c/o h/a at this time. Denies head trauma. Upon exam pt A&Ox3 gross neuro intact, lungs cta bilaterally, no difficulty speaking in complete sentences, s1s2 heart sounds heard, pulses x 4, jiménez x4, abdomen soft nontender nondistended, skin intact. Placed on cardiac monitor bradycardic 50s. Pt denies chest pain, sob, ha, n/v/d, abdominal pain, f/c, urinary symptoms, hematuria. EKG done & given to MD. 20 gauge left a/c from [place].

## 2019-04-28 NOTE — H&P ADULT - NSHPLABSRESULTS_GEN_ALL_CORE
====================  VITALS (Last 12 hrs):  ====================  T(C): 36.6 (04-28-19 @ 04:09), Max: 37.2 (04-28-19 @ 01:48)  T(F): 97.9 (04-28-19 @ 04:09), Max: 98.9 (04-28-19 @ 01:48)  HR: 62 (04-28-19 @ 04:09) (51 - 68)  BP: 101/58 (04-28-19 @ 04:09) (101/58 - 122/74)  RR: 16 (04-28-19 @ 04:09) (12 - 18)  SpO2: 96% (04-28-19 @ 04:09) (96% - 100%)      ====================  NEW LABS:  ====================  04-28    143  |  108  |  10  ----------------------------<  97  3.8   |  24  |  0.61    Ca    9.2      28 Apr 2019 03:11    TPro  6.1  /  Alb  3.8  /  TBili  0.5  /  DBili  x   /  AST  14  /  ALT  18  /  AlkPhos  51  04-28    PT/INR - ( 28 Apr 2019 03:11 )   PT: 11.5 sec;   INR: 1.01 ratio      PTT - ( 28 Apr 2019 03:11 )  PTT:32.7 sec    CKMB Units: 1.5 ng/mL (04-28 @ 03:11)

## 2019-04-28 NOTE — H&P ADULT - ASSESSMENT
53F smoker with PMHx MVP transferred from [facility] for symptomatic bradycardia 53F former smoker with PMHx MVP, ADD and anxiety; transferred from O[facility] for symptomatic bradycardia.

## 2019-04-28 NOTE — ED PROVIDER NOTE - NS ED ROS FT
CONSTITUTIONAL: No fevers, no chills  Eyes: no visual changes  Ears: no ear drainage, no ear pain  Nose: no nasal congestion  Mouth/Throat: no sore throat  Cardiovascular: No Chest pain  Respiratory: No SOB  Gastrointestinal: No n/v/d, no abd pain  Genitourinary: no dysuria, no hematuria  SKIN: no rashes.  NEURO: +lightheadedness

## 2019-04-28 NOTE — PROGRESS NOTE ADULT - SUBJECTIVE AND OBJECTIVE BOX
====================  CCU MIDNIGHT ROUNDS  ====================    ISABEL TAN  [id number]    Patient is a 53y old  Female who presents with a chief complaint of Symptomatic bradycardia (28 Apr 2019 07:45)    ====================  SUMMARY: 53F former smoker with PMH MVP, ADD, anxiety disorder; transferred from [facility] ED for symptomatic bradycardia.  Patient states Friday night she was at a wedding and felt episodes of dizziness.  She endorses she drank 4 glasses of wine.  When she awoke the next day she felt extreme weakness with an episode of sustained dizziness which prompted her to go to the ED.  While in ED, she was found to be bradycardic in 30s.  Patient transferred to [facility] for further management.  Dopamine gtt at 5mcg was started with appropriate response in HR.  Patient denies any syncope.  No history of AVN blockers. Currently patient denies dizziness, lightheadedness, chest pain, palpitations, sob, abd pain, n/v/d/c.  Of note, patient states she runs about 5 miles 3-5x per week.  This week she states she was unable to run because she felt weakness with episodes of palpitations.  Patient also endorses having prior syncopal episodes in the past with ER visit for dehydration.   ====================    ====================  NEW EVENTS:  Remains on dopamine gtt at 10mcg.  Denies dizziness or lightheadedness.  TTE 4/28 EF 84% hyperdynamic LVSF, normal RV, no effusion.   ====================    MEDICATIONS  (STANDING):  amphetamine/dextroamphetamine XR 10 milliGRAM(s) Oral with breakfast  chlorhexidine 4% Liquid 1 Application(s) Topical <User Schedule>  DOPamine Infusion 5 MICROgram(s)/kG/Min (13.181 mL/Hr) IV Continuous <Continuous>  fluticasone propionate 50 MICROgram(s)/spray Nasal Spray 1 Spray(s) Both Nostrils daily  heparin  Injectable 5000 Unit(s) SubCutaneous every 8 hours    MEDICATIONS  (PRN):  acetaminophen   Tablet .. 650 milliGRAM(s) Oral every 6 hours PRN Moderate Pain (4 - 6)  LORazepam     Tablet 0.25 milliGRAM(s) Oral daily PRN Anxiety    ====================  VITALS (Last 12 hrs):  ====================  T(C): 36.8 (04-28-19 @ 16:30), Max: 36.8 (04-28-19 @ 16:30)  T(F): 98.2 (04-28-19 @ 16:30), Max: 98.2 (04-28-19 @ 16:30)  HR: 52 (04-28-19 @ 20:30) (49 - 77)  BP: 99/54 (04-28-19 @ 20:30) (93/50 - 110/56)  BP(mean): 71 (04-28-19 @ 20:30) (67 - 80)  RR: 19 (04-28-19 @ 20:30) (16 - 19)  SpO2: 99% (04-28-19 @ 19:00) (96% - 99%)        I&O's Summary    27 Apr 2019 07:01  -  28 Apr 2019 07:00  --------------------------------------------------------  IN: 60.9 mL / OUT: 0 mL / NET: 60.9 mL    28 Apr 2019 07:01  -  28 Apr 2019 21:21  --------------------------------------------------------  IN: 1073.2 mL / OUT: 1400 mL / NET: -326.8 mL      ====================  NEW LABS:  ====================  04-28    143  |  108  |  10  ----------------------------<  97  3.8   |  24  |  0.61    Ca    9.2      28 Apr 2019 03:11    TPro  6.1  /  Alb  3.8  /  TBili  0.5  /  DBili  x   /  AST  14  /  ALT  18  /  AlkPhos  51  04-28    PT/INR - ( 28 Apr 2019 03:11 )   PT: 11.5 sec;   INR: 1.01 ratio      PTT - ( 28 Apr 2019 03:11 )  PTT:32.7 sec    CKMB Units: 1.5 ng/mL (04-28 @ 03:11)    ====================  PLAN:  ====================  #Symptomatic bradycardia; possible vagal mediated   - Pending CT cors in AM to eval for ischemic component   - Continue Dopamine gtt at 10mcg/kg/hr   - Keep R2 pads on, atropine at bsd  - No indication for TVP at this time   - TSH & Cortisol wnl, CE negative   - Avoid AVN blockers   - Check Orthostatics  - EP in AM      Alena Granados CCU NP  Beeper #[id number]  Spectra # [id number] ====================  CCU MIDNIGHT ROUNDS  ====================    ISABEL TAN  [id number]    Patient is a 53y old  Female who presents with a chief complaint of Symptomatic bradycardia (28 Apr 2019 07:45)    ====================  SUMMARY: 53F former smoker with PMH MVP, ADD, anxiety disorder; transferred from [facility] ED for symptomatic bradycardia.  Patient states Friday night she was at a wedding and felt episodes of dizziness.  She endorses she drank 4 glasses of wine.  When she awoke the next day she felt extreme weakness with an episode of sustained dizziness which prompted her to go to the ED.  While in ED, she was found to be bradycardic in 30s.  Patient transferred to [facility] for further management.  Dopamine gtt at 5mcg was started with appropriate response in HR.  Patient denies any syncope.  No history of AVN blockers. Currently patient denies dizziness, lightheadedness, chest pain, palpitations, sob, abd pain, n/v/d/c.  Of note, patient states she runs about 5 miles 3-5x per week.  This week she states she was unable to run because she felt weakness with episodes of palpitations.  Patient also endorses having prior syncopal episodes in the past with ER visit for dehydration.   ====================    ====================  NEW EVENTS:  Remains on dopamine gtt at 10mcg.  Denies dizziness or lightheadedness.  Orthostatics negative.  TTE 4/28 EF 84% hyperdynamic LVSF, normal RV, no effusion.  Patient had appropriate response of HR while getting out of bed and standing up, HR increased to 70s.  Appears baseline HR is 50-60s.   ====================    MEDICATIONS  (STANDING):  amphetamine/dextroamphetamine XR 10 milliGRAM(s) Oral with breakfast  chlorhexidine 4% Liquid 1 Application(s) Topical <User Schedule>  DOPamine Infusion 5 MICROgram(s)/kG/Min (13.181 mL/Hr) IV Continuous <Continuous>  fluticasone propionate 50 MICROgram(s)/spray Nasal Spray 1 Spray(s) Both Nostrils daily  heparin  Injectable 5000 Unit(s) SubCutaneous every 8 hours    MEDICATIONS  (PRN):  acetaminophen   Tablet .. 650 milliGRAM(s) Oral every 6 hours PRN Moderate Pain (4 - 6)  LORazepam     Tablet 0.25 milliGRAM(s) Oral daily PRN Anxiety    ====================  VITALS (Last 12 hrs):  ====================  T(C): 36.8 (04-28-19 @ 16:30), Max: 36.8 (04-28-19 @ 16:30)  T(F): 98.2 (04-28-19 @ 16:30), Max: 98.2 (04-28-19 @ 16:30)  HR: 52 (04-28-19 @ 20:30) (49 - 77)  BP: 99/54 (04-28-19 @ 20:30) (93/50 - 110/56)  BP(mean): 71 (04-28-19 @ 20:30) (67 - 80)  RR: 19 (04-28-19 @ 20:30) (16 - 19)  SpO2: 99% (04-28-19 @ 19:00) (96% - 99%)        I&O's Summary    27 Apr 2019 07:01  -  28 Apr 2019 07:00  --------------------------------------------------------  IN: 60.9 mL / OUT: 0 mL / NET: 60.9 mL    28 Apr 2019 07:01  -  28 Apr 2019 21:21  --------------------------------------------------------  IN: 1073.2 mL / OUT: 1400 mL / NET: -326.8 mL      ====================  NEW LABS:  ====================  04-28    143  |  108  |  10  ----------------------------<  97  3.8   |  24  |  0.61    Ca    9.2      28 Apr 2019 03:11    TPro  6.1  /  Alb  3.8  /  TBili  0.5  /  DBili  x   /  AST  14  /  ALT  18  /  AlkPhos  51  04-28    PT/INR - ( 28 Apr 2019 03:11 )   PT: 11.5 sec;   INR: 1.01 ratio      PTT - ( 28 Apr 2019 03:11 )  PTT:32.7 sec    CKMB Units: 1.5 ng/mL (04-28 @ 03:11)    ====================  PLAN:  ====================  #Symptomatic bradycardia; possible vagal mediated   - Pending CT cors in AM to eval for ischemic component   - Continue Dopamine gtt at 10mcg/kg/hr   - Keep R2 pads on, atropine at bsd  - No indication for TVP at this time   - TSH & Cortisol wnl, CE negative   - Orthostatics negative   - Avoid AVN blockers   - EP in AM      Alena Granados CCU NP  Beeper #[id number]  Spectra # [phone]

## 2019-04-28 NOTE — ED PROVIDER NOTE - CARE PLAN
Principal Discharge DX:	Symptomatic bradycardia Principal Discharge DX:	Symptomatic bradycardia  Secondary Diagnosis:	Sinus pause

## 2019-04-28 NOTE — H&P ADULT - NSHPSOCIALHISTORY_GEN_ALL_CORE
ETOH -   Cigarette smoking -   Illegal drug use - ETOH - socially ~2 glasses of wine per week   Cigarette smoking - former, quite 2yrs ago (0.5PPD x 40yrs)  Illegal drug use - denies

## 2019-04-28 NOTE — H&P ADULT - NSHPREVIEWOFSYSTEMS_GEN_ALL_CORE

## 2019-04-28 NOTE — CONSULT NOTE ADULT - SUBJECTIVE AND OBJECTIVE BOX
Patient seen and evaluated at bedside    Chief Complaint:    HPI:  53F hx Mitral valve prolapse presenting for lightheadedness, found to be in sinus brooke with pauses.  Patient notes she was in her USOH until Friday evening while at a friend's wedding. She had drank about 4 glasses of wine, was dancing and felt very lightheaded, had to sit down, almost passed out, but denied full LOC. All day Saturday, she felt "wiped out" was on couch and was having numerous episodes of lightheadedness even while at rest. Went to [facility] and was found to have bradycardic events every 20 minutes into the 30s. Transferred for [facility] for evaluation where she was noted to have sinus pauses of 5 seconds. During pause, she will feel nauseous, grab siderails, feels like she is falling, has not passed out.  Patient admits she has fainted numerous times throughout childhood and told she was dehydrated, and once went to ED where IVF made her feel better.   Denied any Chest pain, but endorses SOB during episodes.   Takes Aderall regularly for ADD and lorazepam intermittently (3x week)  Denied family history of CAD or pacemakers  No AV rowan agents    PMHx:   Smoker  Fainting  MVP (mitral valve prolapse)    PSHx:     Allergies:  lidocaine (Anaphylaxis)    Home Meds:  Home Medications:  nicotine 2 mg oral transmucosal gum:  oral transmucosal  (27 Apr 2019 19:55)  nicotine 21 mg/24 hr transdermal film, extended release: 1 each transdermal once a day (27 Apr 2019 19:55)      FAMILY HISTORY:  Denied pertinent fam hx of CAD or need for PPM      Social History:  Smoking History: former smoker  Alcohol Use: social, weekly, 4 drinks at recent wedding  Drug Use: Denied    REVIEW OF SYSTEMS:  CONSTITUTIONAL: No weakness, fevers or chills  EYES/ENT: No visual changes;  No dysphagia  NECK: No pain or stiffness  RESPIRATORY: as per HPI  CARDIOVASCULAR: as per HPI  GASTROINTESTINAL: No abdominal or epigastric pain. No nausea, vomiting, or hematemesis; No diarrhea or constipation. No melena or hematochezia.  BACK: No back pain  GENITOURINARY: No dysuria, frequency or hematuria  NEUROLOGICAL: No numbness or weakness  SKIN: No itching, burning, rashes, or lesions   All other review of systems is negative unless indicated above.    Physical Exam:  T(F): 97.9 (04-28), Max: 98.9 (04-28)  HR: 62 (04-28) (51 - 68)  BP: 101/58 (04-28) (101/58 - 122/74)  RR: 16 (04-28)  SpO2: 96% (04-28)  GENERAL: No acute distress, well-developed  HEAD:  Atraumatic, Normocephalic  ENT: EOMI, PERRLA, conjunctiva and sclera clear, Neck supple, No JVD, moist mucosa  CHEST/LUNG: Clear to auscultation bilaterally; No wheeze, equal breath sounds bilaterally   BACK: No spinal tenderness  HEART: Regular rate and rhythm; No murmurs, rubs, or gallops  ABDOMEN: Soft, Nontender, Nondistended; Bowel sounds present  EXTREMITIES:  No clubbing, cyanosis, or edema  PSYCH: Nl behavior, nl affect  NEUROLOGY: AAOx3, non-focal, cranial nerves intact  SKIN: Normal color, No rashes or lesions    Cardiovascular Diagnostic Testing:    ECG: Personally reviewed:  Sinus brooke with sinus arrhythmia  Tele strip from ED with 5s sinus pauses WITHOUT P-P prolongation prior to dropped beat.    Imaging:  IMPRESSION:   No CT evidence of acute intracranial hemorrhage, mass effect or acute   territorial infarct.  No significant interval change since 03/06/2017.    Follow-up MRI can be performed as clinically warranted    Labs: Personally reviewed                        12.8   6.2   )-----------( 177      ( 28 Apr 2019 03:11 )             35.6     04-28    143  |  108  |  10  ----------------------------<  97  3.8   |  24  |  0.61    Ca    9.2      28 Apr 2019 03:11    TPro  6.1  /  Alb  3.8  /  TBili  0.5  /  DBili  x   /  AST  14  /  ALT  18  /  AlkPhos  51  04-28    PT/INR - ( 28 Apr 2019 03:11 )   PT: 11.5 sec;   INR: 1.01 ratio         PTT - ( 28 Apr 2019 03:11 )  PTT:32.7 sec    CARDIAC MARKERS ( 28 Apr 2019 03:11 )  <6 ng/L / x     / x     / x     / x     / 1.5 ng/mL  CARDIAC MARKERS ( 27 Apr 2019 21:11 )  x     / <.015 ng/mL / x     / x     / x     / x            Serum Pro-Brain Natriuretic Peptide: 53 pg/mL (04-27 @ 21:11)

## 2019-04-28 NOTE — ED ADULT NURSE REASSESSMENT NOTE - NS ED NURSE REASSESS COMMENT FT1
Pt reporting 2 episodes of dizziness, MD Neda Delcid notified, strip printed from war room and given to MD. Pt placed on pacer pads.

## 2019-04-28 NOTE — PROGRESS NOTE ADULT - SUBJECTIVE AND OBJECTIVE BOX
Admission date:   CHIEF COMPLAINT:  HPI: 53 year old female former smoker with past medical history of mitral valve prolapse, anxiety and attention deficit disorder who was transferred from [facility] ED for symptomatic bradycardia.  Patient states Friday night she was at a wedding and felt episodes of dizziness.  She endorses she drank 4 glasses of wine.  When she awoke the next day she felt extreme weakness with an episode of sustained dizziness which prompted her to go to the ED.  While in ED, she was found to be bradycardic in 30s.  Patient transferred to [facility] for further management.  Dopamine gtt at 5mcg was started with appropriate response in HR.  Patient denies any syncope.  No history of AVN blockers. Currently patient denies dizziness, lightheadedness, chest pain, palpitations, sob, abd pain, n/v/d/c.  Of note, patient states she runs about 5 miles 3-5x per week.  This week she states she was unable to run because she felt weakness with episodes of palpitations.  Patient also endorses having prior syncopal episodes in the past with ER visit for dehydration. (2019 04:05)    INTERVAL HISTORY: Admitted to CCU2 this morning symptomatic bradycardia; Continues on Dopamine infusion increased to 7.5mg/kg/hr; Sinus bradycardia 47-60     REVIEW OF SYSTEMS: Denies ; all others negative    MEDICATIONS  (STANDING):  chlorhexidine 4% Liquid 1 Application(s) Topical <User Schedule>  DOPamine Infusion 5 MICROgram(s)/kG/Min (13.181 mL/Hr) IV Continuous <Continuous>  heparin  Injectable 5000 Unit(s) SubCutaneous every 8 hours    MEDICATIONS  (PRN):      Objective:  ICU Vital Signs Last 24 Hrs  T(C): 36.7 (2019 04:45), Max: 37.2 (2019 01:48)  T(F): 98.1 (2019 04:45), Max: 98.9 (2019 01:48)  HR: 50 (2019 06:45) (50 - 68)  BP: 101/52 (2019 06:45) (100/50 - 122/74)  BP(mean): 73 (2019 06:45) (70 - 77)  ABP: --  ABP(mean): --  RR: 14 (2019 06:45) (12 - 18)  SpO2: 96% (2019 06:45) (95% - 100%)           @ 07:01  -   @ 07:00  --------------------------------------------------------  IN: 60.9 mL / OUT: 0 mL / NET: 60.9 mL      Daily Height in cm: 162.56 (2019 04:45)    Daily     PHYSICAL EXAM:  Constitutional:  HEENT:  Respiratory:  Cardiovascular:  Gastrointestinal:  Genitourinary:  Extremities:  Vascular:  Neurological:  Skin:      TELEMETRY:  Sinus bradycardia    EKG: SB 57; DOMINIC 168; QRS 90; QTc 421      Labs:                          12.8   6.2   )-----------( 177      ( 2019 03:11 )             35.6         143  |  108  |  10  ----------------------------<  97  3.8   |  24  |  0.61    Ca    9.2      2019 03:11    TPro  6.1  /  Alb  3.8  /  TBili  0.5  /  DBili  x   /  AST  14  /  ALT  18  /  AlkPhos  51      LIVER FUNCTIONS - ( 2019 03:11 )  Alb: 3.8 g/dL / Pro: 6.1 g/dL / ALK PHOS: 51 U/L / ALT: 18 U/L / AST: 14 U/L / GGT: x           PT/INR - ( 2019 03:11 )   PT: 11.5 sec;   INR: 1.01 ratio         PTT - ( 2019 03:11 )  PTT:32.7 sec  CKMB Units: 1.5 ng/mL ( @ 03:11)    Urinalysis Basic - ( 2019 21:11 )    Color: Yellow / Appearance: Clear / S.020 / pH: x  Gluc: x / Ketone: Negative  / Bili: Negative / Urobili: Negative mg/dL   Blood: x / Protein: Negative mg/dL / Nitrite: Negative   Leuk Esterase: Moderate / RBC: 0-2 /HPF / WBC 3-5   Sq Epi: x / Non Sq Epi: Moderate / Bacteria: Few        HEALTH ISSUES - PROBLEM Dx:  Prophylactic measure: Prophylactic measure  Smoker: Smoker  Symptomatic bradycardia: Symptomatic bradycardia Admission date:   CHIEF COMPLAINT:  HPI: 53 year old female former smoker with past medical history of mitral valve prolapse, anxiety and attention deficit disorder who was transferred from [facility] ED for symptomatic bradycardia.  Patient states Friday night she was at a wedding and felt episodes of dizziness.  She endorses she drank 4 glasses of wine.  When she awoke the next day she felt extreme weakness with an episode of sustained dizziness which prompted her to go to the ED.  While in ED, she was found to be bradycardic in 30s.  Patient transferred to [facility] for further management.  Dopamine gtt at 5mcg was started with appropriate response in HR.  Patient denies any syncope.  No history of AVN blockers. Currently patient denies dizziness, lightheadedness, chest pain, palpitations, sob, abd pain, n/v/d/c.  Of note, patient states she runs about 5 miles 3-5x per week.  This week she states she was unable to run because she felt weakness with episodes of palpitations.  Patient also endorses having prior syncopal episodes in the past with ER visit for dehydration. (2019 04:05)    INTERVAL HISTORY: Admitted to CCU2 this morning symptomatic bradycardia; Continues on Dopamine infusion increased to 10mg/kg/min; Sinus bradycardia 47-60   Resting comfortably in bed; 'I feel better since the dopamine has been going, less dizzy feeling, I can feel the difference'    REVIEW OF SYSTEMS: Denies chest pain, SOB, palpitations, abd pain, NV; + mild dizziness has improved from last night;; all others negative    MEDICATIONS  (STANDING):  chlorhexidine 4% Liquid 1 Application(s) Topical <User Schedule>  DOPamine Infusion 5 MICROgram(s)/kG/Min (13.181 mL/Hr) IV Continuous <Continuous>  heparin  Injectable 5000 Unit(s) SubCutaneous every 8 hours    MEDICATIONS  (PRN):      Objective:  ICU Vital Signs Last 24 Hrs  T(C): 36.7 (2019 04:45), Max: 37.2 (2019 01:48)  T(F): 98.1 (2019 04:45), Max: 98.9 (2019 01:48)  HR: 50 (2019 06:45) (50 - 68)  BP: 101/52 (2019 06:45) (100/50 - 122/74)  BP(mean): 73 (2019 06:45) (70 - 77)  ABP: --  ABP(mean): --  RR: 14 (2019 06:45) (12 - 18)  SpO2: 96% (2019 06:45) (95% - 100%)           @ 07:01  -   @ 07:00  --------------------------------------------------------  IN: 60.9 mL / OUT: 0 mL / NET: 60.9 mL      Daily Height in cm: 162.56 (2019 04:45)    Daily     PHYSICAL EXAM:  Constitutional: WD/WN; NAD  HEENT: oral mucosa pink moist, sclera clear  Respiratory: Regular unlabored, CTA, no wheeze  Cardiovascular: RRR S1S2 no M/R/G; no LE edema; R2 pads on  Gastrointestinal: soft ND/NT; + bowel sounds  Genitourinary: voiding on own  Extremities: TOMLINSON equal strength  Vascular: warm peripherally  Neurological: A & O x 3 mood & affect appropriate  Skin: Warm dry intact, no rash      TELEMETRY:  Sinus bradycardia    EKG: SB 57; DOMINIC 168; QRS 90; QTc 421      Labs:                          12.8   6.2   )-----------( 177      ( 2019 03:11 )             35.6         143  |  108  |  10  ----------------------------<  97  3.8   |  24  |  0.61    Ca    9.2      2019 03:11    TPro  6.1  /  Alb  3.8  /  TBili  0.5  /  DBili  x   /  AST  14  /  ALT  18  /  AlkPhos  51      LIVER FUNCTIONS - ( 2019 03:11 )  Alb: 3.8 g/dL / Pro: 6.1 g/dL / ALK PHOS: 51 U/L / ALT: 18 U/L / AST: 14 U/L / GGT: x           PT/INR - ( 2019 03:11 )   PT: 11.5 sec;   INR: 1.01 ratio         PTT - ( 2019 03:11 )  PTT:32.7 sec  CKMB Units: 1.5 ng/mL ( @ 03:11)    Urinalysis Basic - ( 2019 21:11 )    Color: Yellow / Appearance: Clear / S.020 / pH: x  Gluc: x / Ketone: Negative  / Bili: Negative / Urobili: Negative mg/dL   Blood: x / Protein: Negative mg/dL / Nitrite: Negative   Leuk Esterase: Moderate / RBC: 0-2 /HPF / WBC 3-5   Sq Epi: x / Non Sq Epi: Moderate / Bacteria: Few        HEALTH ISSUES - PROBLEM Dx:  Prophylactic measure: Prophylactic measure  Smoker: Smoker  Symptomatic bradycardia: Symptomatic bradycardia

## 2019-04-28 NOTE — H&P ADULT - PROBLEM SELECTOR PLAN 1
Dopamine gtt 5mcg/kg/hr   R2 pads on, atropine at bsd  Monitor telemetry closely   No TVP indicated at this time   Avoid AVN blockers   Obtain TTE   Check TSH   BNP & CE wnl   Check Orthostatics   AM Cortisol level  EP consult Admit to CCU2  Dopamine gtt 5mcg/kg/hr   R2 pads on, atropine at bsd  Monitor telemetry closely   No TVP indicated at this time   Avoid AVN blockers   Obtain TTE in AM  Check TSH   BNP & CE wnl   Check Orthostatics   AM Cortisol level  EP consult

## 2019-04-28 NOTE — ED ADULT NURSE NOTE - TEMPLATE
"              After Visit Summary   10/2/2017    Deonna Cullen    MRN: [id number]           Patient Information     Date Of Birth          1995        Visit Information        Provider Department      10/2/2017 9:45 AM Buddy Pedraza PA-C [facility]        Today's Diagnoses     Productive cough    -  1    Chest congestion        Throat pain           Follow-ups after your visit        Who to contact     If you have questions or need follow up information about today's clinic visit or your schedule please contact [facility] directly at [phone].  Normal or non-critical lab and imaging results will be communicated to you by Xipinhart, letter or phone within 4 business days after the clinic has received the results. If you do not hear from us within 7 days, please contact the clinic through Xipinhart or phone. If you have a critical or abnormal lab result, we will notify you by phone as soon as possible.  Submit refill requests through [employer] or call your pharmacy and they will forward the refill request to us. Please allow 3 business days for your refill to be completed.          Additional Information About Your Visit        MyChart Information     [employer] lets you send messages to your doctor, view your test results, renew your prescriptions, schedule appointments and more. To sign up, go to [email or web address] . Click on \"Log in\" on the left side of the screen, which will take you to the Welcome page. Then click on \"Sign up Now\" on the right side of the page.     You will be asked to enter the access code listed below, as well as some personal information. Please follow the directions to create your username and password.     Your access code is: [email or web address]  Expires: 2017 11:23 AM     Your access code will  in 90 days. If you need help or a new code, please call your [facility] clinic or [phone].        Care EveryWhere ID     This " is your Care EveryWhere ID. This could be used by other organizations to access your [place] medical records  [id number]        Your Vitals Were     Pulse Temperature Pulse Oximetry BMI (Body Mass Index)          68 98.4  F (36.9  C) 97% 27.46 kg/m2         Blood Pressure from Last 3 Encounters:   10/02/17 100/60   02/20/17 122/60   04/18/16 98/62    Weight from Last 3 Encounters:   10/02/17 165 lb (74.8 kg)   02/20/17 164 lb (74.4 kg)   04/18/16 157 lb 14.4 oz (71.6 kg)              We Performed the Following     Beta strep group A culture     Strep, Rapid Screen          Today's Medication Changes          These changes are accurate as of: 10/2/17 11:23 AM.  If you have any questions, ask your nurse or doctor.               Start taking these medicines.        Dose/Directions    azithromycin 250 MG tablet   Commonly known as:  ZITHROMAX   Used for:  Productive cough, Chest congestion   Started by:  Buddy Pedraza PA-C        2 tabs po qd day 1, then 1 tab po qd days 2-5   Quantity:  6 tablet   Refills:  0       MAGIC MOUTHWASH (ENTER INGREDIENTS IN COMMENTS)   Used for:  Throat pain   Started by:  Buddy Pedraza PA-C        Dose:  5-10 mL   Swish and spit 5-10 mLs in mouth every 6 hours as needed Pharmacy please compound 30 ml of Benadryl (12.5 mg/5 ml), 60 ml Maalox and 30 ml Viscous Lidocaine   Quantity:  120 mL   Refills:  0            Where to get your medicines      These medications were sent to [facility]/pharmacy #[id number] [place], [place]  [address] [address]     Phone:  [phone]     azithromycin 250 MG tablet         Some of these will need a paper prescription and others can be bought over the counter.  Ask your nurse if you have questions.     Bring a paper prescription for each of these medications     MAGIC MOUTHWASH (ENTER INGREDIENTS IN COMMENTS)                Primary Care Provider Fax #    Physician No Ref-Primary [phone]       No address on  file        Equal Access to Services     [facility] MARTIN : Hadii vanessa mccollum lisa Heck, wasydnida luqadaha, qaybta kagennarojay jeter, monica cottrell. So [facility] [phone].    ATENCIÓN: Si habla español, tiene a yates disposición servicios gratuitos de asistencia lingüística. Llame al [phone].    We comply with applicable federal civil rights laws and [place] laws. We do not discriminate on the basis of race, color, national origin, age, disability, sex, sexual orientation, or gender identity.            Thank you!     Thank you for choosing [facility]  for your care. Our goal is always to provide you with excellent care. Hearing back from our patients is one way we can continue to improve our services. Please take a few minutes to complete the written survey that you may receive in the mail after your visit with us. Thank you!             Your Updated Medication List - Protect others around you: Learn how to safely use, store and throw away your medicines at www.disposemymeds.org.          This list is accurate as of: 10/2/17 11:23 AM.  Always use your most recent med list.                   Brand Name Dispense Instructions for use Diagnosis    azithromycin 250 MG tablet    ZITHROMAX    6 tablet    2 tabs po qd day 1, then 1 tab po qd days 2-5    Productive cough, Chest congestion       ibuprofen 200 MG tablet    ADVIL/MOTRIN     Take 200 mg by mouth every 4 hours as needed for mild pain        MAGIC MOUTHWASH (ENTER INGREDIENTS IN COMMENTS)     120 mL    Swish and spit 5-10 mLs in mouth every 6 hours as needed Pharmacy please compound 30 ml of Benadryl (12.5 mg/5 ml), 60 ml Maalox and 30 ml Viscous Lidocaine    Throat pain       UNKNOWN TO PATIENT      econozole cream to treat a fungus/yeast on her chest area        VITAMIN C PO      Take by mouth as needed           Cardiac

## 2019-04-28 NOTE — PROGRESS NOTE ADULT - PROBLEM SELECTOR PLAN 1
monitor rhythm  C/w Dopamine infusion  avoid all AV rowan blockers  R2 pads on; pacer at bedside  TTE this morning f/u results  EP

## 2019-04-28 NOTE — ED ADULT NURSE REASSESSMENT NOTE - NS ED NURSE REASSESS COMMENT FT1
Pt reported episode of dizziness, war room reported 5 second pause. Strip obtained from war room. Given to MD Neda Delcid,.

## 2019-04-28 NOTE — ED PROVIDER NOTE - ATTENDING CONTRIBUTION TO CARE
53 yof pmhx mvp transfer from outside ER w bradycardia into the 30s. pt denies any cp or sob. states in the past has felt lightheaded and has 'passed out before' and felt weak - has been to the ED for this before and states was always told it was due to dehydration irmpved w fluids. states over last few days has felt increasingly weak - went to a wedding and had some wine, attributed feeling to that. states in last 24 hrs has been more weak, lightheaded, near synocpal.   recent travel to [place]. no current bb or ccb. smoker      ROS:   constitutional - no fever, no chills  eyes - no visual changes, no redness  eent - no sore throat, no nasal congestion  cvs - no chest pain, no leg swelling  resp - no shortness of breath, no cough  gi - no abdominal pain, no vomiting, no diarrhea  gu - no dysuria, no hematuria  msk - no acute back pain, no joint swelling  skin - no rashes, no jaundice  neuro - no headache, no focal weakness  psych - no acute mental health issue     Physical Exam:   constitutional - well appearing, awake and alert, oriented x3  head - no external evidence of trauma  eent - no conjunctival injection or icterus, mucous membranes moist   cvs - sinus brooke; no murmurs, no peripheral edema  resp - breath sounds clear and equal bilat, normal respiratory effort  gi - abdomen soft and nontender, no rigidity, guarding or rebound, bowel sounds present  msk - moving all extremities spontaneously, gait is at baseline for patient  neuro - alert and oriented x3, no focal deficits, CNs 2-12 grossly intact  skin- no jaundice, warm and dry  psych - mood and affect wnl, no apparent risk to self or others     pt w sinus brooke but having intermittent pauses lasting 3-5 sec in ED, pacer pads placed, atropine at bedside, bp stable, however due to pauses will start dopamine gtt as per cards request. admit to icu setting. DELPHINE Belcher MD

## 2019-04-28 NOTE — CONSULT NOTE ADULT - ASSESSMENT
53F hx Mitral valve prolapse presenting for lightheadedness, found to be in sinus brooke with pauses.  Sinus pauses are occurring every 20 minutes, Some strips with slight P-P prolongation before drop (40-80 ms), other strips with NO prolongation prior to lack of sinus P.    #Pauses: Appear to be vagal, especially given alcohol's correlation and worsening with rest, though lack of P-P prolongation concerning. Patient has never lost conscious, though very symptomatic.  -Tele  -Echo  -Dopamine 5 mcg/kg/min  -CCU2 admission due to symptomatic pauses.  -Atropine 0.5 PRN  -Pacer pads on  -Consider 1L NS bolus given prior response to fluids  -Holding off TVP, case discussed with Dr. Duong 53F hx Mitral valve prolapse presenting for lightheadedness, found to be in sinus brooke with pauses.  Sinus pauses are occurring every 20 minutes, Some strips with slight P-P prolongation before drop (40-80 ms), other strips with NO prolongation prior to lack of sinus P.    #Pauses: Appear to be vagal, especially given alcohol's correlation and worsening with rest, though lack of P-P prolongation concerning. Patient has never lost conscious, though very symptomatic.  -Tele  -Echo  -Dopamine 5 mcg/kg/min  -CCU2 admission due to symptomatic pauses.  -Atropine 0.5 PRN  -Pacer pads on  -Consider 1L NS bolus given prior response to fluids  -Holding off TVP, case discussed with Dr. Parmar

## 2019-04-28 NOTE — H&P ADULT - NSHPPHYSICALEXAM_GEN_ALL_CORE
General: WN/WD NAD  Neurology: A&Ox3, nonfocal, TOMLINSON x 4  Respiratory: CTA B/L, no wheezing   CV: RRR, S1S2, no murmurs, rubs or gallops  Abdominal: Soft, NT, ND +BS, Last BM  Extremities: No edema, + peripheral pulses General: WN/WD NAD  Neurology: A&Ox3, nonfocal, TOMLINSON x 4  Respiratory: CTA B/L, no wheezing   CV: RRR, S1S2, no murmurs, rubs or gallops  Abdominal: Soft, NT, ND +BS, Last BM 4/27  Extremities: No edema, + peripheral pulses

## 2019-04-29 LAB
ANION GAP SERPL CALC-SCNC: 10 MMOL/L — SIGNIFICANT CHANGE UP (ref 5–17)
BUN SERPL-MCNC: 11 MG/DL — SIGNIFICANT CHANGE UP (ref 7–23)
CALCIUM SERPL-MCNC: 9.5 MG/DL — SIGNIFICANT CHANGE UP (ref 8.4–10.5)
CHLORIDE SERPL-SCNC: 104 MMOL/L — SIGNIFICANT CHANGE UP (ref 96–108)
CO2 SERPL-SCNC: 24 MMOL/L — SIGNIFICANT CHANGE UP (ref 22–31)
CREAT SERPL-MCNC: 0.59 MG/DL — SIGNIFICANT CHANGE UP (ref 0.5–1.3)
GLUCOSE SERPL-MCNC: 118 MG/DL — HIGH (ref 70–99)
HCT VFR BLD CALC: 40.3 % — SIGNIFICANT CHANGE UP (ref 34.5–45)
HCV AB S/CO SERPL IA: 0.17 S/CO — SIGNIFICANT CHANGE UP (ref 0–0.99)
HCV AB SERPL-IMP: SIGNIFICANT CHANGE UP
HGB BLD-MCNC: 14.2 G/DL — SIGNIFICANT CHANGE UP (ref 11.5–15.5)
MAGNESIUM SERPL-MCNC: 2.1 MG/DL — SIGNIFICANT CHANGE UP (ref 1.6–2.6)
MCHC RBC-ENTMCNC: 31.3 PG — SIGNIFICANT CHANGE UP (ref 27–34)
MCHC RBC-ENTMCNC: 35.2 GM/DL — SIGNIFICANT CHANGE UP (ref 32–36)
MCV RBC AUTO: 89 FL — SIGNIFICANT CHANGE UP (ref 80–100)
PHOSPHATE SERPL-MCNC: 3.2 MG/DL — SIGNIFICANT CHANGE UP (ref 2.5–4.5)
PLATELET # BLD AUTO: 216 K/UL — SIGNIFICANT CHANGE UP (ref 150–400)
POTASSIUM SERPL-MCNC: 4.1 MMOL/L — SIGNIFICANT CHANGE UP (ref 3.5–5.3)
POTASSIUM SERPL-SCNC: 4.1 MMOL/L — SIGNIFICANT CHANGE UP (ref 3.5–5.3)
RBC # BLD: 4.53 M/UL — SIGNIFICANT CHANGE UP (ref 3.8–5.2)
RBC # FLD: 11.7 % — SIGNIFICANT CHANGE UP (ref 10.3–14.5)
SODIUM SERPL-SCNC: 138 MMOL/L — SIGNIFICANT CHANGE UP (ref 135–145)
WBC # BLD: 8.2 K/UL — SIGNIFICANT CHANGE UP (ref 3.8–10.5)
WBC # FLD AUTO: 8.2 K/UL — SIGNIFICANT CHANGE UP (ref 3.8–10.5)

## 2019-04-29 PROCEDURE — 99232 SBSQ HOSP IP/OBS MODERATE 35: CPT

## 2019-04-29 PROCEDURE — 93010 ELECTROCARDIOGRAM REPORT: CPT

## 2019-04-29 PROCEDURE — 99233 SBSQ HOSP IP/OBS HIGH 50: CPT | Mod: GC

## 2019-04-29 RX ORDER — MECLIZINE HCL 12.5 MG
25 TABLET ORAL ONCE
Qty: 0 | Refills: 0 | Status: DISCONTINUED | OUTPATIENT
Start: 2019-04-29 | End: 2019-04-29

## 2019-04-29 RX ADMIN — HEPARIN SODIUM 5000 UNIT(S): 5000 INJECTION INTRAVENOUS; SUBCUTANEOUS at 14:45

## 2019-04-29 RX ADMIN — DOPAMINE HYDROCHLORIDE 13.18 MICROGRAM(S)/KG/MIN: 40 INJECTION, SOLUTION, CONCENTRATE INTRAVENOUS at 18:52

## 2019-04-29 RX ADMIN — DOPAMINE HYDROCHLORIDE 26.36 MICROGRAM(S)/KG/MIN: 40 INJECTION, SOLUTION, CONCENTRATE INTRAVENOUS at 06:27

## 2019-04-29 RX ADMIN — DEXTROAMPHETAMINE SACCHARATE, AMPHETAMINE ASPARTATE, DEXTROAMPHETAMINE SULFATE AND AMPHETAMINE SULFATE 10 MILLIGRAM(S): 1.875; 1.875; 1.875; 1.875 TABLET ORAL at 09:27

## 2019-04-29 RX ADMIN — DOPAMINE HYDROCHLORIDE 13.18 MICROGRAM(S)/KG/MIN: 40 INJECTION, SOLUTION, CONCENTRATE INTRAVENOUS at 19:55

## 2019-04-29 RX ADMIN — CHLORHEXIDINE GLUCONATE 1 APPLICATION(S): 213 SOLUTION TOPICAL at 06:28

## 2019-04-29 RX ADMIN — DOPAMINE HYDROCHLORIDE 26.36 MICROGRAM(S)/KG/MIN: 40 INJECTION, SOLUTION, CONCENTRATE INTRAVENOUS at 17:17

## 2019-04-29 RX ADMIN — HEPARIN SODIUM 5000 UNIT(S): 5000 INJECTION INTRAVENOUS; SUBCUTANEOUS at 05:29

## 2019-04-29 RX ADMIN — HEPARIN SODIUM 5000 UNIT(S): 5000 INJECTION INTRAVENOUS; SUBCUTANEOUS at 21:20

## 2019-04-29 NOTE — PROGRESS NOTE ADULT - ASSESSMENT
54 y/o F former smoker with PMH mitral valve prolapse, anxiety and attention deficit disorder who was transferred from [facility] ED for symptomatic bradycardia. On dopamine infusion      Neuro - no issues    Pulmonary - no issues SpO2 95-99 on room air    CV  Bradycardia  monitor rhythm  C/w Dopamine infusion  avoid all AV rowan blockers  R2 pads on; pacer at bedside  TTE EG 84%; Hyperdynamic LV; Norm RV   Cardiac CT cors today  EP following     GI - no issues     - no issues voiding on own (U/a mod leukocyte no WBCs asymptomatic; afebrile)    PPX - DVT - c/w HSQ 52 y/o F former smoker with PMH mitral valve prolapse, anxiety and attention deficit disorder who was transferred from [facility] ED for symptomatic bradycardia. On dopamine infusion      Neuro - no issues    Pulmonary - no issues SpO2 95-99 on room air    CV  Bradycardia  monitor rhythm  C/w Dopamine infusion  avoid all AV rowan blockers  R2 pads on; pacer at bedside  TTE EG 84%; Hyperdynamic LV; Norm RV   Cardiac CT cors today  EP following     GI - no issues     - no issues     PPX - DVT - c/w HSQ

## 2019-04-29 NOTE — PROGRESS NOTE ADULT - SUBJECTIVE AND OBJECTIVE BOX
INTERVAL HPI/OVERNIGHT EVENTS: SB overnight 40's-50's. with Brief episodes of SR 60's-70's    MEDICATIONS  (STANDING):  amphetamine/dextroamphetamine XR 10 milliGRAM(s) Oral with breakfast  chlorhexidine 4% Liquid 1 Application(s) Topical <User Schedule>  DOPamine Infusion 10 MICROgram(s)/kG/Min (26.363 mL/Hr) IV Continuous <Continuous>  fluticasone propionate 50 MICROgram(s)/spray Nasal Spray 1 Spray(s) Both Nostrils daily  heparin  Injectable 5000 Unit(s) SubCutaneous every 8 hours    MEDICATIONS  (PRN):  acetaminophen   Tablet .. 650 milliGRAM(s) Oral every 6 hours PRN Moderate Pain (4 - 6)  LORazepam     Tablet 0.25 milliGRAM(s) Oral daily PRN Anxiety      Allergies    lidocaine (Anaphylaxis)    Intolerances      ROS:  General: + Generalized weakness.   Cardiovascular: denies chest pain/palpitations/dizziness  Respiratory and Thorax: denies SOB  Gastrointestinal: denies abdominal pain/diarrhea//bloody stool  Musculoskeletal denies restricted motion  Hematologic: denies abnormal bleeding    Vital Signs Last 24 Hrs  T(C): 36.8 (2019 16:30), Max: 36.8 (2019 16:30)  T(F): 98.2 (2019 16:30), Max: 98.2 (2019 16:30)  HR: 53 (2019 08:00) (42 - 77)  BP: 99/52 (2019 08:00) (84/46 - 112/55)  BP(mean): 72 (2019 08:00) (62 - 79)  RR: 16 (2019 08:00) (14 - 32)  SpO2: 98% (2019 05:50) (96% - 99%)    Physical Exam:  Constitutional: well developed, well nourished and in  no acute distress  Neurological: Alert & Oriented x 3,  no focal deficits  Respiratory: Breathing nonlabored; CTA bilaterally  Cardiovascular: (+) S1 & S2, RRR  Gastrointestinal: soft, NT/ no rebound, nondistended, (+) BS  Extremities: +2 bilateral radial pulses. No pedal edema.  Skin:  normal skin color and pigmentation, no skin lesions      LABS:                        14.2   8.2   )-----------( 216      ( 2019 05:26 )             40.3     04-29    138  |  104  |  11  ----------------------------<  118<H>  4.1   |  24  |  0.59    Ca    9.5      2019 05:26  Phos  3.2       Mg     2.1         TPro  6.1  /  Alb  3.8  /  TBili  0.5  /  DBili  x   /  AST  14  /  ALT  18  /  AlkPhos  51      PT/INR - ( 2019 03:11 )   PT: 11.5 sec;   INR: 1.01 ratio         PTT - ( 2019 03:11 )  PTT:32.7 sec  Urinalysis Basic - ( 2019 21:11 )    Color: Yellow / Appearance: Clear / S.020 / pH: x  Gluc: x / Ketone: Negative  / Bili: Negative / Urobili: Negative mg/dL   Blood: x / Protein: Negative mg/dL / Nitrite: Negative   Leuk Esterase: Moderate / RBC: 0-2 /HPF / WBC 3-5   Sq Epi: x / Non Sq Epi: Moderate / Bacteria: Few        RADIOLOGY & ADDITIONAL TESTS: < from: Transthoracic Echocardiogram (19 @ 08:02) >  Dimensions:    Normal Values:  LA:     3.1    2.0 - 4.0 cm  Ao:     3.0    2.0 - 3.8 cm  SEPTUM: 0.9    0.6 - 1.2 cm  PWT:    0.9  0.6 - 1.1 cm  LVIDd:  4.7    3.0 - 5.6 cm  LVIDs:  2.2    1.8 - 4.0 cm  Derived variables:  LVMI: 82 g/m2  RWT: 0.38  Fractional short: 53 %  EF (Teicholtz): 84 %  ------------------------------------------------------------------------  Observations:  Mitral Valve: Normal mitral valve.  Aortic Valve/Aorta: Normal trileaflet aortic valve.  Aortic Root: 3 cm.  Left Atrium: LA volume index = 15 cc/m2.  Left Ventricle: Hyperdynamic left ventricular systolic  function. Normal left ventricular internal dimensions and  wall thicknesses. Normal diastolic function  Right Heart: Normal right atrium. Normal right ventricular  size and function. Normal tricuspid valve. Pulmonic valve  not well visualized.  Pericardium/Pleura: Normal pericardium with no pericardial  effusion.  Hemodynamic: Estimated right atrial pressure is 8 mm Hg.  ------------------------------------------------------------------------  Conclusions:  1. Normal left ventricular internal dimensions and wall  thicknesses.  2. Hyperdynamic left ventricular systolic function.  3. Normal right ventricular size and function.  *** No previous Echo exam.  ------------------------------------------------------------------------  Confirmed on  2019 - 16:22:48 by Tasha Garcia M.D.  ------------------------------------------------------------------------          TELE: SB overnight 40's-50's. with Brief episodes of SR 60's-70's

## 2019-04-29 NOTE — PROGRESS NOTE ADULT - SUBJECTIVE AND OBJECTIVE BOX
Admission date:   CHIEF COMPLAINT:  HPI: 53 year old female former smoker with past medical history of mitral valve prolapse, anxiety and attention deficit disorder who was transferred from [facility] ED for symptomatic bradycardia.  Patient states Friday night she was at a wedding and felt episodes of dizziness.  She endorses she drank 4 glasses of wine.  When she awoke the next day she felt extreme weakness with an episode of sustained dizziness which prompted her to go to the ED.  While in ED, she was found to be bradycardic in 30s.  Patient transferred to [facility] for further management.  Dopamine gtt at 5mcg was started with appropriate response in HR.  Patient denies any syncope.  No history of AVN blockers. Currently patient denies dizziness, lightheadedness, chest pain, palpitations, sob, abd pain, n/v/d/c.  Of note, patient states she runs about 5 miles 3-5x per week.  This week she states she was unable to run because she felt weakness with episodes of palpitations.  Patient also endorses having prior syncopal episodes in the past with ER visit for dehydration. (2019 04:05)    INTERVAL HISTORY:    REVIEW OF SYSTEMS: Denies ; all others negative    MEDICATIONS  (STANDING):  amphetamine/dextroamphetamine XR 10 milliGRAM(s) Oral with breakfast  chlorhexidine 4% Liquid 1 Application(s) Topical <User Schedule>  DOPamine Infusion 10 MICROgram(s)/kG/Min (26.363 mL/Hr) IV Continuous <Continuous>  fluticasone propionate 50 MICROgram(s)/spray Nasal Spray 1 Spray(s) Both Nostrils daily  heparin  Injectable 5000 Unit(s) SubCutaneous every 8 hours    MEDICATIONS  (PRN):  acetaminophen   Tablet .. 650 milliGRAM(s) Oral every 6 hours PRN Moderate Pain (4 - 6)  LORazepam     Tablet 0.25 milliGRAM(s) Oral daily PRN Anxiety      Objective:  ICU Vital Signs Last 24 Hrs  T(C): 36.8 (2019 16:30), Max: 36.8 (2019 16:30)  T(F): 98.2 (2019 16:30), Max: 98.2 (2019 16:30)  HR: 45 (2019 07:00) (42 - 77)  BP: 98/51 (2019 07:00) (84/46 - 112/55)  BP(mean): 73 (2019 07:00) (62 - 80)  ABP: --  ABP(mean): --  RR: 14 (2019 07:00) (14 - 32)  SpO2: 98% (2019 05:50) (95% - 99%)           @ 07:01  -   @ 07:00  --------------------------------------------------------  IN: 1327.2 mL / OUT: 1400 mL / NET: -72.8 mL      Daily     Daily     PHYSICAL EXAM:  Constitutional:  HEENT:  Respiratory:  Cardiovascular:  Gastrointestinal:  Genitourinary:  Extremities:  Vascular:  Neurological:  Skin:      TELEMETRY: SR no events    EKG:       Labs:                          14.2   8.2   )-----------( 216      ( 2019 05:26 )             40.3         138  |  104  |  11  ----------------------------<  118<H>  4.1   |  24  |  0.59    Ca    9.5      2019 05:26  Phos  3.2       Mg     2.1         TPro  6.1  /  Alb  3.8  /  TBili  0.5  /  DBili  x   /  AST  14  /  ALT  18  /  AlkPhos  51      LIVER FUNCTIONS - ( 2019 03:11 )  Alb: 3.8 g/dL / Pro: 6.1 g/dL / ALK PHOS: 51 U/L / ALT: 18 U/L / AST: 14 U/L / GGT: x           PT/INR - ( 2019 03:11 )   PT: 11.5 sec;   INR: 1.01 ratio         PTT - ( 2019 03:11 )  PTT:32.7 sec    Urinalysis Basic - ( 2019 21:11 )    Color: Yellow / Appearance: Clear / S.020 / pH: x  Gluc: x / Ketone: Negative  / Bili: Negative / Urobili: Negative mg/dL   Blood: x / Protein: Negative mg/dL / Nitrite: Negative   Leuk Esterase: Moderate / RBC: 0-2 /HPF / WBC 3-5   Sq Epi: x / Non Sq Epi: Moderate / Bacteria: Few        HEALTH ISSUES - PROBLEM Dx:  Prophylactic measure: Prophylactic measure  Smoker: Smoker  Symptomatic bradycardia: Symptomatic bradycardia Admission date:   CHIEF COMPLAINT:  HPI: 53 year old female former smoker with past medical history of mitral valve prolapse, anxiety and attention deficit disorder who was transferred from [facility] ED for symptomatic bradycardia.  Patient states Friday night she was at a wedding and felt episodes of dizziness.  She endorses she drank 4 glasses of wine.  When she awoke the next day she felt extreme weakness with an episode of sustained dizziness which prompted her to go to the ED.  While in ED, she was found to be bradycardic in 30s.  Patient transferred to [facility] for further management.  Dopamine gtt at 5mcg was started with appropriate response in HR.  Patient denies any syncope.  No history of AVN blockers. Currently patient denies dizziness, lightheadedness, chest pain, palpitations, sob, abd pain, n/v/d/c.  Of note, patient states she runs about 5 miles 3-5x per week.  This week she states she was unable to run because she felt weakness with episodes of palpitations.  Patient also endorses having prior syncopal episodes in the past with ER visit for dehydration. (2019 04:05)    INTERVAL HISTORY: Continues on Dopamine 10mcg/kg/min bradycardic HR to 40s overnight sleeping 50-60 this am  Resting comfortably in bed; 'I feel weak in general, just not myself, but not dizzy', 'slept well'    REVIEW OF SYSTEMS: Denies chest pain, palpitations, SOB, NV; + weak as above; all others negative    MEDICATIONS  (STANDING):  amphetamine/dextroamphetamine XR 10 milliGRAM(s) Oral with breakfast  chlorhexidine 4% Liquid 1 Application(s) Topical <User Schedule>  DOPamine Infusion 10 MICROgram(s)/kG/Min (26.363 mL/Hr) IV Continuous <Continuous>  fluticasone propionate 50 MICROgram(s)/spray Nasal Spray 1 Spray(s) Both Nostrils daily  heparin  Injectable 5000 Unit(s) SubCutaneous every 8 hours    MEDICATIONS  (PRN):  acetaminophen   Tablet .. 650 milliGRAM(s) Oral every 6 hours PRN Moderate Pain (4 - 6)  LORazepam     Tablet 0.25 milliGRAM(s) Oral daily PRN Anxiety      Objective:  ICU Vital Signs Last 24 Hrs  T(C): 36.8 (2019 16:30), Max: 36.8 (2019 16:30)  T(F): 98.2 (2019 16:30), Max: 98.2 (2019 16:30)  HR: 45 (2019 07:00) (42 - 77)  BP: 98/51 (2019 07:00) (84/46 - 112/55)  BP(mean): 73 (2019 07:00) (62 - 80)  ABP: --  ABP(mean): --  RR: 14 (2019 07:00) (14 - 32)  SpO2: 98% (2019 05:50) (95% - 99%)           @ 07:01  -   @ 07:00  --------------------------------------------------------  IN: 1327.2 mL / OUT: 1400 mL / NET: -72.8 mL      Daily     Daily     PHYSICAL EXAM:  Constitutional: NAD  HEENT: oral mucosa pink moist, sclera clear  Respiratory: Regular unlabored, CTA no wheeze  Cardiovascular: RRR S1 S2 no M/R/G, no LE edema  Gastrointestinal: soft ND/NT; + bowel sounds  Genitourinary: voiding on own  Extremities: TOMLINSON equal strength  Vascular: warm peripherally  Neurological: A & O x 3 mood & affect appropriate  Skin: warm dry intact, no rash      TELEMETRY: SR no events    EKG: SB 51; DOMINIC 166; QRS 80; QT/QTc 430/396      Labs:                          14.2   8.2   )-----------( 216      ( 2019 05:26 )             40.3         138  |  104  |  11  ----------------------------<  118<H>  4.1   |  24  |  0.59    Ca    9.5      2019 05:26  Phos  3.2       Mg     2.1         TPro  6.1  /  Alb  3.8  /  TBili  0.5  /  DBili  x   /  AST  14  /  ALT  18  /  AlkPhos  51      LIVER FUNCTIONS - ( 2019 03:11 )  Alb: 3.8 g/dL / Pro: 6.1 g/dL / ALK PHOS: 51 U/L / ALT: 18 U/L / AST: 14 U/L / GGT: x           PT/INR - ( 2019 03:11 )   PT: 11.5 sec;   INR: 1.01 ratio         PTT - ( 2019 03:11 )  PTT:32.7 sec    Urinalysis Basic - ( 2019 21:11 )    Color: Yellow / Appearance: Clear / S.020 / pH: x  Gluc: x / Ketone: Negative  / Bili: Negative / Urobili: Negative mg/dL   Blood: x / Protein: Negative mg/dL / Nitrite: Negative   Leuk Esterase: Moderate / RBC: 0-2 /HPF / WBC 3-5   Sq Epi: x / Non Sq Epi: Moderate / Bacteria: Few        HEALTH ISSUES - PROBLEM Dx:  Prophylactic measure: Prophylactic measure  Smoker: Smoker  Symptomatic bradycardia: Symptomatic bradycardia

## 2019-04-29 NOTE — PROGRESS NOTE ADULT - SUBJECTIVE AND OBJECTIVE BOX
====================  CCU MIDNIGHT ROUNDS  ====================    ISABEL TAN  [id number]    Patient is a 53y old  Female who presents with a chief complaint of Symptomatic bradycardia (29 Apr 2019 09:29)  ====================  SUMMARY:  ====================    ====================  NEW EVENTS: Dopamine gtt discontinued.  Denies dizziness or lightheadedness.  Telemetry: NSB 40-60s, no ectopy noted.  ====================    MEDICATIONS  (STANDING):  amphetamine/dextroamphetamine XR 10 milliGRAM(s) Oral with breakfast  chlorhexidine 4% Liquid 1 Application(s) Topical <User Schedule>  fluticasone propionate 50 MICROgram(s)/spray Nasal Spray 1 Spray(s) Both Nostrils daily  heparin  Injectable 5000 Unit(s) SubCutaneous every 8 hours    MEDICATIONS  (PRN):  acetaminophen   Tablet .. 650 milliGRAM(s) Oral every 6 hours PRN Moderate Pain (4 - 6)  LORazepam     Tablet 0.25 milliGRAM(s) Oral daily PRN Anxiety    ====================  VITALS (Last 12 hrs):  ====================  T(C): 36.7 (04-29-19 @ 21:00), Max: 36.7 (04-29-19 @ 21:00)  T(F): 98.1 (04-29-19 @ 21:00), Max: 98.1 (04-29-19 @ 21:00)  HR: 53 (04-29-19 @ 23:00) (50 - 69)  BP: 96/54 (04-29-19 @ 23:00) (91/54 - 134/71)  BP(mean): 70 (04-29-19 @ 23:00) (67 - 90)  RR: 18 (04-29-19 @ 23:00) (16 - 28)  SpO2: 95% (04-29-19 @ 19:00) (95% - 95%)        I&O's Summary    28 Apr 2019 07:01  -  29 Apr 2019 07:00  --------------------------------------------------------  IN: 1353.6 mL / OUT: 1400 mL / NET: -46.4 mL    29 Apr 2019 07:01  -  30 Apr 2019 00:00  --------------------------------------------------------  IN: 557.6 mL / OUT: 500 mL / NET: 57.6 mL      ====================  NEW LABS:  ====================  04-29    138  |  104  |  11  ----------------------------<  118<H>  4.1   |  24  |  0.59    Ca    9.5      29 Apr 2019 05:26  Phos  3.2     04-29  Mg     2.1     04-29    TPro  6.1  /  Alb  3.8  /  TBili  0.5  /  DBili  x   /  AST  14  /  ALT  18  /  AlkPhos  51  04-28    PT/INR - ( 28 Apr 2019 03:11 )   PT: 11.5 sec;   INR: 1.01 ratio      PTT - ( 28 Apr 2019 03:11 )  PTT:32.7 sec    ====================  PLAN:  ====================  #Symptomatic bradycardia; possible vagal mediated   - Pending CT cors in AM to eval for ischemic component   - DC Dopamine gtt, monitor telemetry closely   - Keep R2 pads on, atropine at bsd  - No indication for TVP at this time   - TSH & Cortisol wnl, CE negative   - Orthostatics negative   - Avoid AVN blockers   - PPx HSQ      Alena Granados CCU NP  Beeper #[id number]  Spectra # [phone] ====================  CCU MIDNIGHT ROUNDS  ====================    ISABEL TAN  [id number]    Patient is a 53y old  Female who presents with a chief complaint of Symptomatic bradycardia (29 Apr 2019 09:29)  ====================  SUMMARY:  ====================    ====================  NEW EVENTS: Dopamine gtt discontinued.  Denies dizziness or lightheadedness.  Telemetry: NSB 40-60s, no ectopy noted; remains hemodynamically stable.   ====================    MEDICATIONS  (STANDING):  amphetamine/dextroamphetamine XR 10 milliGRAM(s) Oral with breakfast  chlorhexidine 4% Liquid 1 Application(s) Topical <User Schedule>  fluticasone propionate 50 MICROgram(s)/spray Nasal Spray 1 Spray(s) Both Nostrils daily  heparin  Injectable 5000 Unit(s) SubCutaneous every 8 hours    MEDICATIONS  (PRN):  acetaminophen   Tablet .. 650 milliGRAM(s) Oral every 6 hours PRN Moderate Pain (4 - 6)  LORazepam     Tablet 0.25 milliGRAM(s) Oral daily PRN Anxiety    ====================  VITALS (Last 12 hrs):  ====================  T(C): 36.7 (04-29-19 @ 21:00), Max: 36.7 (04-29-19 @ 21:00)  T(F): 98.1 (04-29-19 @ 21:00), Max: 98.1 (04-29-19 @ 21:00)  HR: 53 (04-29-19 @ 23:00) (50 - 69)  BP: 96/54 (04-29-19 @ 23:00) (91/54 - 134/71)  BP(mean): 70 (04-29-19 @ 23:00) (67 - 90)  RR: 18 (04-29-19 @ 23:00) (16 - 28)  SpO2: 95% (04-29-19 @ 19:00) (95% - 95%)        I&O's Summary    28 Apr 2019 07:01  -  29 Apr 2019 07:00  --------------------------------------------------------  IN: 1353.6 mL / OUT: 1400 mL / NET: -46.4 mL    29 Apr 2019 07:01  -  30 Apr 2019 00:00  --------------------------------------------------------  IN: 557.6 mL / OUT: 500 mL / NET: 57.6 mL      ====================  NEW LABS:  ====================  04-29    138  |  104  |  11  ----------------------------<  118<H>  4.1   |  24  |  0.59    Ca    9.5      29 Apr 2019 05:26  Phos  3.2     04-29  Mg     2.1     04-29    TPro  6.1  /  Alb  3.8  /  TBili  0.5  /  DBili  x   /  AST  14  /  ALT  18  /  AlkPhos  51  04-28    PT/INR - ( 28 Apr 2019 03:11 )   PT: 11.5 sec;   INR: 1.01 ratio      PTT - ( 28 Apr 2019 03:11 )  PTT:32.7 sec    ====================  PLAN:  ====================  #Symptomatic bradycardia; possible vagal mediated   - Pending CT cors in AM to eval for ischemic component   - DC Dopamine gtt, monitor telemetry closely   - Keep R2 pads on, atropine at bsd  - No indication for TVP at this time   - TSH & Cortisol wnl, CE negative   - Orthostatics negative   - Avoid AVN blockers   - PPx HSQ      Alena Granados CCU NP  Beeper #[id number]  Spectra # [phone]

## 2019-04-29 NOTE — PROGRESS NOTE ADULT - ASSESSMENT
54 y/o F with pMhx og  Mitral valve prolapse  who initially presented to [facility]  for lightheadedness and was found to be in sinus brooke with pauses q 20mins, started on dopamine gtt and transferred to this facility for further w/u and management. She remains mainly SB with no further pauses noted overnight.    # Symptomatic Bradycardia w/ pauses  - Ischemia evaluation ongoing; plan for stress test today. Continue with dopamine and wean off post stress test.  - CT coronaries pending  - Continue to hold off on all AVN blockers  - Echo w/ EF of 84%  - If no improvement/ worsening will likely need Pacer therapy  - EP will continue to follow  - Continue telemetry monitoring     [phone] 52 y/o F with pMhx og  Mitral valve prolapse  who initially presented to [facility]  for lightheadedness and was found to be in sinus brooke with pauses q 20mins, started on dopamine gtt and transferred to this facility for further w/u and management. She remains mainly SB with no further pauses noted overnight.    # Symptomatic Bradycardia w/ pauses  - Ischemia evaluation ongoing; plan for stress test today. Continue with dopamine and wean off post stress test and monitor rhythm If no improvement/ worsening  will likely need Pacer therapy  - CT coronaries pending  - Continue to hold off on all AVN blockers  - Echo w/ EF of 84%  - EP will continue to follow  - Continue telemetry monitoring     [phone]

## 2019-04-29 NOTE — PROGRESS NOTE ADULT - ATTENDING COMMENTS
Patient is seen and examined with fellow, NP and the CCU house-staff. I agree with the history, physical and the assessment and plan.  symptomatic bradycardia on dopamine gtt  awaiting ischemic evaluation - nuclear stress test  f/u with EPS

## 2019-04-29 NOTE — PROGRESS NOTE ADULT - PROBLEM SELECTOR PLAN 1
monitor rhythm  C/w Dopamine infusion  avoid all AV rowan blockers  R2 pads on; pacer at bedside  Cardiac CT cors today  EP following f/u recs

## 2019-04-29 NOTE — PROGRESS NOTE ADULT - ATTENDING COMMENTS
Agree with above fellow's note.    Symptomatic sinus pauses. Their appearance on telemetry is not typical of a vagal etiology though I remain suspicious of this diagnosis given her recent good health and exercise tolerance (3 miles running, 3x/week) and long history of more typical vasovagal syncope dating back to her 20s.   For now, recommend:  - TTE for structural evaluation  - Ischemic eval to rule out proximal RCA disease (she is a lifetime smoker)  - Continue dopamine for time being.  - If episodes continue without an identifiable source over next 48 hours, pacing may be warranted.  - Will follow. seen and examined with NP. I agree with H & P, A & P.  titrate off dopamine

## 2019-04-30 LAB
ALBUMIN SERPL ELPH-MCNC: 4 G/DL — SIGNIFICANT CHANGE UP (ref 3.3–5)
ALP SERPL-CCNC: 55 U/L — SIGNIFICANT CHANGE UP (ref 40–120)
ALT FLD-CCNC: 15 U/L — SIGNIFICANT CHANGE UP (ref 10–45)
ANION GAP SERPL CALC-SCNC: 11 MMOL/L — SIGNIFICANT CHANGE UP (ref 5–17)
AST SERPL-CCNC: 14 U/L — SIGNIFICANT CHANGE UP (ref 10–40)
BILIRUB SERPL-MCNC: 0.5 MG/DL — SIGNIFICANT CHANGE UP (ref 0.2–1.2)
BLD GP AB SCN SERPL QL: NEGATIVE — SIGNIFICANT CHANGE UP
BUN SERPL-MCNC: 11 MG/DL — SIGNIFICANT CHANGE UP (ref 7–23)
CALCIUM SERPL-MCNC: 9.6 MG/DL — SIGNIFICANT CHANGE UP (ref 8.4–10.5)
CHLORIDE SERPL-SCNC: 104 MMOL/L — SIGNIFICANT CHANGE UP (ref 96–108)
CK MB CFR SERPL CALC: 1.2 NG/ML — SIGNIFICANT CHANGE UP (ref 0–3.8)
CK SERPL-CCNC: 50 U/L — SIGNIFICANT CHANGE UP (ref 25–170)
CO2 SERPL-SCNC: 25 MMOL/L — SIGNIFICANT CHANGE UP (ref 22–31)
CREAT SERPL-MCNC: 0.72 MG/DL — SIGNIFICANT CHANGE UP (ref 0.5–1.3)
GLUCOSE SERPL-MCNC: 96 MG/DL — SIGNIFICANT CHANGE UP (ref 70–99)
HCT VFR BLD CALC: 39.9 % — SIGNIFICANT CHANGE UP (ref 34.5–45)
HGB BLD-MCNC: 14.1 G/DL — SIGNIFICANT CHANGE UP (ref 11.5–15.5)
MAGNESIUM SERPL-MCNC: 2 MG/DL — SIGNIFICANT CHANGE UP (ref 1.6–2.6)
MCHC RBC-ENTMCNC: 31.3 PG — SIGNIFICANT CHANGE UP (ref 27–34)
MCHC RBC-ENTMCNC: 35.2 GM/DL — SIGNIFICANT CHANGE UP (ref 32–36)
MCV RBC AUTO: 88.8 FL — SIGNIFICANT CHANGE UP (ref 80–100)
PHOSPHATE SERPL-MCNC: 3.3 MG/DL — SIGNIFICANT CHANGE UP (ref 2.5–4.5)
PLATELET # BLD AUTO: 177 K/UL — SIGNIFICANT CHANGE UP (ref 150–400)
POTASSIUM SERPL-MCNC: 4.3 MMOL/L — SIGNIFICANT CHANGE UP (ref 3.5–5.3)
POTASSIUM SERPL-SCNC: 4.3 MMOL/L — SIGNIFICANT CHANGE UP (ref 3.5–5.3)
PROT SERPL-MCNC: 6.8 G/DL — SIGNIFICANT CHANGE UP (ref 6–8.3)
RBC # BLD: 4.49 M/UL — SIGNIFICANT CHANGE UP (ref 3.8–5.2)
RBC # FLD: 11.7 % — SIGNIFICANT CHANGE UP (ref 10.3–14.5)
RH IG SCN BLD-IMP: POSITIVE — SIGNIFICANT CHANGE UP
SODIUM SERPL-SCNC: 140 MMOL/L — SIGNIFICANT CHANGE UP (ref 135–145)
TROPONIN T, HIGH SENSITIVITY RESULT: 8 NG/L — SIGNIFICANT CHANGE UP (ref 0–51)
WBC # BLD: 8.1 K/UL — SIGNIFICANT CHANGE UP (ref 3.8–10.5)
WBC # FLD AUTO: 8.1 K/UL — SIGNIFICANT CHANGE UP (ref 3.8–10.5)

## 2019-04-30 PROCEDURE — 78452 HT MUSCLE IMAGE SPECT MULT: CPT | Mod: 26

## 2019-04-30 PROCEDURE — 93010 ELECTROCARDIOGRAM REPORT: CPT

## 2019-04-30 PROCEDURE — 99233 SBSQ HOSP IP/OBS HIGH 50: CPT | Mod: GC

## 2019-04-30 RX ORDER — SODIUM CHLORIDE 9 MG/ML
1000 INJECTION INTRAMUSCULAR; INTRAVENOUS; SUBCUTANEOUS
Qty: 0 | Refills: 0 | Status: DISCONTINUED | OUTPATIENT
Start: 2019-04-30 | End: 2019-05-01

## 2019-04-30 RX ORDER — LANOLIN ALCOHOL/MO/W.PET/CERES
5 CREAM (GRAM) TOPICAL AT BEDTIME
Qty: 0 | Refills: 0 | Status: DISCONTINUED | OUTPATIENT
Start: 2019-04-30 | End: 2019-05-01

## 2019-04-30 RX ORDER — SODIUM CHLORIDE 9 MG/ML
500 INJECTION INTRAMUSCULAR; INTRAVENOUS; SUBCUTANEOUS ONCE
Qty: 0 | Refills: 0 | Status: COMPLETED | OUTPATIENT
Start: 2019-04-30 | End: 2019-04-30

## 2019-04-30 RX ADMIN — SODIUM CHLORIDE 500 MILLILITER(S): 9 INJECTION INTRAMUSCULAR; INTRAVENOUS; SUBCUTANEOUS at 06:19

## 2019-04-30 RX ADMIN — SODIUM CHLORIDE 100 MILLILITER(S): 9 INJECTION INTRAMUSCULAR; INTRAVENOUS; SUBCUTANEOUS at 07:11

## 2019-04-30 RX ADMIN — HEPARIN SODIUM 5000 UNIT(S): 5000 INJECTION INTRAVENOUS; SUBCUTANEOUS at 05:15

## 2019-04-30 RX ADMIN — DEXTROAMPHETAMINE SACCHARATE, AMPHETAMINE ASPARTATE, DEXTROAMPHETAMINE SULFATE AND AMPHETAMINE SULFATE 10 MILLIGRAM(S): 1.875; 1.875; 1.875; 1.875 TABLET ORAL at 09:31

## 2019-04-30 RX ADMIN — Medication 5 MILLIGRAM(S): at 23:59

## 2019-04-30 RX ADMIN — CHLORHEXIDINE GLUCONATE 1 APPLICATION(S): 213 SOLUTION TOPICAL at 05:15

## 2019-04-30 NOTE — PROGRESS NOTE ADULT - SUBJECTIVE AND OBJECTIVE BOX
24H hour events: Patient without complaints. Tele shows SR 50-70's bpm overnight. Off dopamine this am with current rhythm: sinus 50-60bpm    MEDICATIONS:  heparin  Injectable 5000 Unit(s) SubCutaneous every 8 hours  acetaminophen   Tablet .. 650 milliGRAM(s) Oral every 6 hours PRN  amphetamine/dextroamphetamine XR 10 milliGRAM(s) Oral with breakfast  LORazepam     Tablet 0.25 milliGRAM(s) Oral daily PRN  chlorhexidine 4% Liquid 1 Application(s) Topical <User Schedule>  fluticasone propionate 50 MICROgram(s)/spray Nasal Spray 1 Spray(s) Both Nostrils daily  sodium chloride 0.9%. 1000 milliLiter(s) IV Continuous <Continuous>      REVIEW OF SYSTEMS:  See HPI, otherwise ROS negative.    PHYSICAL EXAM:  T(C): 36.6 (19 @ 05:00), Max: 36.7 (19 @ 11:00)  HR: 53 (19 @ 09:00) (42 - 69)  BP: 87/50 (19 @ 09:00) (75/42 - 134/71)  RR: 17 (19 @ 09:00) (15 - 28)  SpO2: 96% (19 @ 05:00) (95% - 96%)  Wt(kg): --  I&O's Summary    2019 07:  -  2019 07:00  --------------------------------------------------------  IN: 1277.6 mL / OUT: 500 mL / NET: 777.6 mL    2019 07:  -  2019 09:22  --------------------------------------------------------  IN: 200 mL / OUT: 0 mL / NET: 200 mL        Appearance: Alert. NAD	  Cardiovascular: +S1S2 RRR no m/g/r  Respiratory: CTA B/L	  Psychiatry: A & O x 3, Mood & affect appropriate  Neurologic: Non-focal  Extremities: No edema BLE  Vascular: Peripheral pulses palpable 2+ bilaterally      LABS:	 	    CBC Full  -  ( 2019 06:02 )  WBC Count : 8.1 K/uL  Hemoglobin : 14.1 g/dL  Hematocrit : 39.9 %  Platelet Count - Automated : 177 K/uL  Mean Cell Volume : 88.8 fl  Mean Cell Hemoglobin : 31.3 pg  Mean Cell Hemoglobin Concentration : 35.2 gm/dL        140  |  104  |  11  ----------------------------<  96  4.3   |  25  |  0.72      138  |  104  |  11  ----------------------------<  118<H>  4.1   |  24  |  0.59    Ca    9.6      2019 06:02  Ca    9.5      2019 05:26  Phos  3.3       Phos  3.2       Mg     2.0       Mg     2.1         TPro  6.8  /  Alb  4.0  /  TBili  0.5  /  DBili  x   /  AST  14  /  ALT  15  /  AlkPhos  55        proBNP: Serum Pro-Brain Natriuretic Peptide: 53 pg/mL ( @ 21:11)    TELEMETRY: SR 50-70's bpm  	    	  ASSESSMENT/PLAN: 	  52y/o female h/o MVP, NL LVEF, active runner p/w lightheadedness & found to be sinus brooke with pauses/sinus arrest & placed on dopamine. Overnight, telemetry shows sinus with HR trend between 50-70's bpm. Dopamine d/c'ed this am with HR's between 50-60's at rest.  --Continue to monitor on telemetry off dopamine  --For ischemia eval today. Patient scheduled for stress test   --For possible PPM tomorrow pending telemetry observation off dopamine and stress test results  --Please obtain type & screen (last one was  which will  tomorrow am) & D/C SC heparin in anticipation for possible PPM tomorrow.  --Keep NPO after MN for possible PPM tomorrow    Raya Brown PA-C  [id number] 24H hour events: Patient without complaints. Tele shows SR 50-70's bpm overnight. Off dopamine this am with current rhythm: sinus 50-60bpm    MEDICATIONS:  heparin  Injectable 5000 Unit(s) SubCutaneous every 8 hours  acetaminophen   Tablet .. 650 milliGRAM(s) Oral every 6 hours PRN  amphetamine/dextroamphetamine XR 10 milliGRAM(s) Oral with breakfast  LORazepam     Tablet 0.25 milliGRAM(s) Oral daily PRN  chlorhexidine 4% Liquid 1 Application(s) Topical <User Schedule>  fluticasone propionate 50 MICROgram(s)/spray Nasal Spray 1 Spray(s) Both Nostrils daily  sodium chloride 0.9%. 1000 milliLiter(s) IV Continuous <Continuous>      REVIEW OF SYSTEMS:  See HPI, otherwise ROS negative.    PHYSICAL EXAM:  T(C): 36.6 (19 @ 05:00), Max: 36.7 (19 @ 11:00)  HR: 53 (19 @ 09:00) (42 - 69)  BP: 87/50 (19 @ 09:00) (75/42 - 134/71)  RR: 17 (19 @ 09:00) (15 - 28)  SpO2: 96% (19 @ 05:00) (95% - 96%)  Wt(kg): --  I&O's Summary    2019 07:  -  2019 07:00  --------------------------------------------------------  IN: 1277.6 mL / OUT: 500 mL / NET: 777.6 mL    2019 07:  -  2019 09:22  --------------------------------------------------------  IN: 200 mL / OUT: 0 mL / NET: 200 mL        Appearance: Alert. NAD	  Cardiovascular: +S1S2 RRR no m/g/r  Respiratory: CTA B/L	  Psychiatry: A & O x 3, Mood & affect appropriate  Neurologic: Non-focal  Extremities: No edema BLE  Vascular: Peripheral pulses palpable 2+ bilaterally      LABS:	 	    CBC Full  -  ( 2019 06:02 )  WBC Count : 8.1 K/uL  Hemoglobin : 14.1 g/dL  Hematocrit : 39.9 %  Platelet Count - Automated : 177 K/uL  Mean Cell Volume : 88.8 fl  Mean Cell Hemoglobin : 31.3 pg  Mean Cell Hemoglobin Concentration : 35.2 gm/dL        140  |  104  |  11  ----------------------------<  96  4.3   |  25  |  0.72      138  |  104  |  11  ----------------------------<  118<H>  4.1   |  24  |  0.59    Ca    9.6      2019 06:02  Ca    9.5      2019 05:26  Phos  3.3       Phos  3.2       Mg     2.0       Mg     2.1         TPro  6.8  /  Alb  4.0  /  TBili  0.5  /  DBili  x   /  AST  14  /  ALT  15  /  AlkPhos  55        proBNP: Serum Pro-Brain Natriuretic Peptide: 53 pg/mL ( @ 21:11)    TELEMETRY: SR 50-70's bpm  	    	  ASSESSMENT/PLAN: 	  52y/o female h/o MVP, NL LVEF, active runner p/w lightheadedness & found to be sinus brooke with pauses/sinus arrest & placed on dopamine. Overnight, telemetry shows sinus with HR trend between 50-70's bpm. Dopamine d/c'ed this am with HR's between 50-60's at rest.  --Continue to monitor on telemetry off dopamine  --For ischemia eval today. Patient scheduled for stress test   --For possible PPM tomorrow pending telemetry observation off dopamine and stress test results  --Please obtain type & screen (last one was  which will  tomorrow am) & D/C SC heparin in anticipation for possible PPM tomorrow.  --Keep NPO after MN for possible PPM tomorrow    Raya Brown PA-C  [id number]    Addendum:  Patient went for stress test this afternoon. Heart rate peaked at 100's bpm but aborted prematurely. At bedside with minimal jogging in pace patient had heart go from SR 60-70's bpm to 110's bpm. No indication for PPM at this time.  Would observe overnight and no significant brooke overnight can send home with outpt monitor patch

## 2019-04-30 NOTE — DIETITIAN INITIAL EVALUATION ADULT. - ORAL INTAKE PTA
Pt. reports she tries to consume a "whole foods diet". Avoids processed foods. Diet recall noted. Pt. states she consume either hard boiled eggs, avocado toast or Cheerio's with skim milk for breakfast, a grilled chicken sandwich for lunch and a salad with either salmon or grilled chicken for dinner. Pt. reports she does not take any vitamins or nutritional supplements at home PTA. NKFA./good

## 2019-04-30 NOTE — DIETITIAN INITIAL EVALUATION ADULT. - NS AS NUTRI INTERV MEALS SNACK
Recommend change diet to low sodium. Honor food preferences. Encourage good po intake./General/healthful diet General/healthful diet/Recommend change diet to dash/tlc. [place] food preferences. Encourage good po intake. General/healthful diet/Recommend change diet to low sodium. Honor food preferences. Encourage good po intake.

## 2019-04-30 NOTE — DIETITIAN INITIAL EVALUATION ADULT. - OTHER INFO
Pt. seen for initial nutrition assessment for length of stay on [address] [place] [address]. Pt. reports her usual body weight was around 165 pounds 5 months ago, however intentionally lost 10 pounds by consuming healthier foods and exercising. Pt.'s current standing weight from today 4/30/19 is 155.8 pounds. No reports of nausea/vomiting/diarrhea/constipation. Last BM documented yesterday, 4/29. Pt. denies any difficulties chewing/swallowing.

## 2019-04-30 NOTE — DIETITIAN INITIAL EVALUATION ADULT. - PROBLEM SELECTOR PLAN 1
Admit to CCU2  Dopamine gtt 5mcg/kg/hr   R2 pads on, atropine at bsd  Monitor telemetry closely   No TVP indicated at this time   Avoid AVN blockers   Obtain TTE in AM  Check TSH   BNP & CE wnl   Check Orthostatics   AM Cortisol level  EP consult

## 2019-04-30 NOTE — PROGRESS NOTE ADULT - PROBLEM SELECTOR PLAN 1
s/p 500mL NS for hypotension overnight  Avoid AV Sania blocking agents  Plan for Stress Test or CT Coronaries based on availability  [place]yme Titers  Type and Screen in anticipation for PPM  NPO past MN in anticipation for PPM  Hold Heparin SQ for anticipation of PPM  SCD while in bed

## 2019-04-30 NOTE — DIETITIAN INITIAL EVALUATION ADULT. - SOURCE
patient/other (specify)/Comprehensive chart review other (specify)/Comprehensive chart review, pt.'s niece at bedside/patient/family/significant other

## 2019-04-30 NOTE — PROGRESS NOTE ADULT - ATTENDING COMMENTS
seen and examined with NP. I agree with H & P, A & P.  resolving bradycardia of unclear mechanism.  If HR stable would DC 5/1 with OP Richie

## 2019-04-30 NOTE — PROGRESS NOTE ADULT - ATTENDING COMMENTS
Patient is seen and examined with fellow, NP and the CC[facility] house-staff. I agree with the history, physical and the assessment and plan.  awaitng cardiac CT  however if the machine is not working, will refer for nuclear stress  patient has been off of dopamine

## 2019-04-30 NOTE — PROGRESS NOTE ADULT - ASSESSMENT
This is a 53 year old woman with past medical history of MVP, NL LVEF, active runner p/w lightheadedness & found to be sinus brooke with pauses/sinus arrest & placed on dopamine. Overnight, telemetry shows sinus with HR trend between 50-70's bpm. Dopamine d/c'ed this am with HR's between 50-60's at rest.  Plan for ischemic evaluation by Stress Test verse CT Coronaries.

## 2019-04-30 NOTE — PROGRESS NOTE ADULT - SUBJECTIVE AND OBJECTIVE BOX
CHIEF COMPLAINT::    INTERVAL HISTORY:    REVIEW OF SYSTEMS: all others negative    MEDICATIONS  (STANDING):  amphetamine/dextroamphetamine XR 10 milliGRAM(s) Oral with breakfast  chlorhexidine 4% Liquid 1 Application(s) Topical <User Schedule>  fluticasone propionate 50 MICROgram(s)/spray Nasal Spray 1 Spray(s) Both Nostrils daily  heparin  Injectable 5000 Unit(s) SubCutaneous every 8 hours  sodium chloride 0.9%. 1000 milliLiter(s) (100 mL/Hr) IV Continuous <Continuous>    MEDICATIONS  (PRN):  acetaminophen   Tablet .. 650 milliGRAM(s) Oral every 6 hours PRN Moderate Pain (4 - 6)  LORazepam     Tablet 0.25 milliGRAM(s) Oral daily PRN Anxiety      Objective:  Vital Signs Last 24 Hrs  T(C): 36.6 (2019 05:00), Max: 36.7 (2019 11:00)  T(F): 97.8 (2019 05:00), Max: 98.1 (2019 21:00)  HR: 53 (2019 07:00) (42 - 69)  BP: 99/53 (2019 07:00) (75/42 - 134/71)  BP(mean): 74 (2019 07:00) (54 - 90)  RR: 21 (2019 07:00) (15 - 28)  SpO2: 96% (2019 05:00) (95% - 96%)  ICU Vital Signs Last 24 Hrs  T(C): 36.6 (2019 05:00), Max: 36.7 (2019 11:00)  T(F): 97.8 (2019 05:00), Max: 98.1 (2019 21:00)  HR: 53 (2019 07:00) (42 - 69)  BP: 99/53 (2019 07:00) (75/42 - 134/71)  BP(mean): 74 (2019 07:00) (54 - 90)  ABP: --  ABP(mean): --  RR: 21 (2019 07:00) (15 - 28)  SpO2: 96% (2019 05:00) (95% - 96%)      Adult Advanced Hemodynamics Last 24 Hrs  CVP(mm Hg): --  CVP(cm H2O): --  CO: --  CI: --  PA: --  PA(mean): --  PCWP: --  SVR: --  SVRI: --  PVR: --  PVRI: --       @ 07:01  -   @ 07:00  --------------------------------------------------------  IN: 1177.6 mL / OUT: 500 mL / NET: 677.6 mL      Daily     Daily Weight in k.7 (2019 05:00)    PHYSICAL EXAM:      Constitutional:    Eyes:    ENMT:    Neck:    Breasts:    Back:    Respiratory:    Cardiovascular:    Gastrointestinal:    Genitourinary:    Rectal:    Extremities:    Vascular:    Neurological:    Skin:    Lymph Nodes:    Musculoskeletal:    Psychiatric:        TELEMETRY:     EKG:     CARDIAC CATH:     ECHO:    IMAGIN.1   8.1   )-----------( 177      ( 2019 06:02 )             39.9     04-30    140  |  104  |  11  ----------------------------<  96  4.3   |  25  |  0.72    Ca    9.6      2019 06:02  Phos  3.3     -  Mg     2.0         TPro  6.8  /  Alb  4.0  /  TBili  0.5  /  DBili  x   /  AST  14  /  ALT  15  /  AlkPhos  55  30    LIVER FUNCTIONS - ( 2019 06:02 )  Alb: 4.0 g/dL / Pro: 6.8 g/dL / ALK PHOS: 55 U/L / ALT: 15 U/L / AST: 14 U/L / GGT: x                 *BLOOD GAS/ARTERIAL/MIXED/VENOUS  *LACTATE      HEALTH ISSUES - PROBLEM Dx:  Prophylactic measure: Prophylactic measure  Smoker: Smoker  Symptomatic bradycardia: Symptomatic bradycardia        HEALTH ISSUES - R/O PROBLEM Dx:      TORI PriceU NP   #[id number] CHIEF COMPLAINT: Symptomatic bradycardia    INTERVAL HISTORY:  This is a 53 year old woman with past medical history of MVP, NL LVEF, active runner p/w lightheadedness & found to be sinus brooke with pauses/sinus arrest & placed on dopamine. Overnight, telemetry shows sinus with HR trend between 50-70's bpm. Dopamine d/c'ed this am with HR's between 50-60's at rest.  Plan for ischemic evaluation by Stress Test verse CT Coronaries.    REVIEW OF SYSTEMS: Denies lightheadedness, CP, SOB, all others negative    MEDICATIONS  (STANDING):  amphetamine/dextroamphetamine XR 10 milliGRAM(s) Oral with breakfast  chlorhexidine 4% Liquid 1 Application(s) Topical <User Schedule>  fluticasone propionate 50 MICROgram(s)/spray Nasal Spray 1 Spray(s) Both Nostrils daily  heparin  Injectable 5000 Unit(s) SubCutaneous every 8 hours  sodium chloride 0.9%. 1000 milliLiter(s) (100 mL/Hr) IV Continuous <Continuous>    MEDICATIONS  (PRN):  acetaminophen   Tablet .. 650 milliGRAM(s) Oral every 6 hours PRN Moderate Pain (4 - 6)  LORazepam     Tablet 0.25 milliGRAM(s) Oral daily PRN Anxiety      Objective:  Vital Signs Last 24 Hrs  T(C): 36.6 (2019 05:00), Max: 36.7 (2019 11:00)  T(F): 97.8 (2019 05:00), Max: 98.1 (2019 21:00)  HR: 53 (2019 07:00) (42 - 69)  BP: 99/53 (2019 07:00) (75/42 - 134/71)  BP(mean): 74 (2019 07:00) (54 - 90)  RR: 21 (2019 07:00) (15 - 28)  SpO2: 96% (2019 05:00) (95% - 96%)  ICU Vital Signs Last 24 Hrs  T(C): 36.6 (2019 05:00), Max: 36.7 (2019 11:00)  T(F): 97.8 (2019 05:00), Max: 98.1 (2019 21:00)  HR: 53 (2019 07:00) (42 - 69)  BP: 99/53 (2019 07:00) (75/42 - 134/71)  BP(mean): 74 (2019 07:00) (54 - 90)  ABP: --  ABP(mean): --  RR: 21 (2019 07:00) (15 - 28)  SpO2: 96% (2019 05:00) (95% - 96%)      Adult Advanced Hemodynamics Last 24 Hrs  CVP(mm Hg): --  CVP(cm H2O): --  CO: --  CI: --  PA: --  PA(mean): --  PCWP: --  SVR: --  SVRI: --  PVR: --  PVRI: --       @ 07:01  -   @ 07:00  --------------------------------------------------------  IN: 1177.6 mL / OUT: 500 mL / NET: 677.6 mL      Daily     Daily Weight in k.7 (2019 05:00)    PHYSICAL EXAM:      Constitutional: No acute distress    Eyes: PERRL, EOMI    ENMT: Nml oral mucosa    Respiratory: CTA Bilaterally    Cardiovascular: S1S2 RRR    Gastrointestinal: +BS    Extremities: No pedal edema    Vascular: +2 Pedal pulses    Neurological: A+OX3          TELEMETRY: SB    EKG:     CARDIAC CATH:     ECHO:  < from: Transthoracic Echocardiogram (19 @ 08:02) >    Patient name: ISABEL TAN  YOB: 1965   Age: 53 (F)   MR#: [id number]  Study Date: 2019  [id number]: Samantha Martinez RDCS  Study quality: Technically fair  Referring Physician: Valdemar Dickinson MD  Blood Pressure: 99/50 mmHg  Height: 163 cm  Weight: 70 kg  BSA: 1.8 m2  ------------------------------------------------------------------------  PROCEDURE: Transthoracic echocardiogram with 2-D, M-Mode  and complete spectral and color flow Doppler.  INDICATION: Abnormal electrocardiogram (ECG) (EKG) (R94.31)  ------------------------------------------------------------------------  Dimensions:    Normal Values:  LA:     3.1    2.0 - 4.0 cm  Ao:     3.0    2.0 - 3.8 cm  SEPTUM: 0.9    0.6 - 1.2 cm  PWT:    0.9  0.6 - 1.1 cm  LVIDd:  4.7    3.0 - 5.6 cm  LVIDs:  2.2    1.8 - 4.0 cm  Derived variables:  LVMI: 82 g/m2  RWT: 0.38  Fractional short: 53 %  EF (Teicholtz): 84 %  ------------------------------------------------------------------------  Observations:  Mitral Valve: Normal mitral valve.  Aortic Valve/Aorta: Normal trileaflet aortic valve.  Aortic Root: 3 cm.  Left Atrium: LA volume index = 15 cc/m2.  Left Ventricle: Hyperdynamic left ventricular systolic  function. Normal left ventricular internal dimensions and  wall thicknesses. Normal diastolic function  Right Heart: Normal right atrium. Normal right ventricular  size and function. Normal tricuspid valve. Pulmonic valve  not well visualized.  Pericardium/Pleura: Normal pericardium with no pericardial  effusion.  Hemodynamic: Estimated right atrial pressure is 8 mm Hg.  ------------------------------------------------------------------------  Conclusions:  1. Normal left ventricular internal dimensions and wall  thicknesses.  2. Hyperdynamic left ventricular systolic function.  3. Normal right ventricular size and function.  *** No previous Echo exam.  ------------------------------------------------------------------------  Confirmed on  2019 - 16:22:48 by Tasha Garcia M.D.  ------------------------------------------------------------------------    < end of copied text >      IMAGIN.1   8.1   )-----------( 177      ( 2019 06:02 )             39.9     04-30    140  |  104  |  11  ----------------------------<  96  4.3   |  25  |  0.72    Ca    9.6      2019 06:02  Phos  3.3     04-30  Mg     2.0     04-30    TPro  6.8  /  Alb  4.0  /  TBili  0.5  /  DBili  x   /  AST  14  /  ALT  15  /  AlkPhos  55  04-30    LIVER FUNCTIONS - ( 2019 06:02 )  Alb: 4.0 g/dL / Pro: 6.8 g/dL / ALK PHOS: 55 U/L / ALT: 15 U/L / AST: 14 U/L / GGT: x                 *BLOOD GAS/ARTERIAL/MIXED/VENOUS  *LACTATE      HEALTH ISSUES - PROBLEM Dx:  Prophylactic measure: Prophylactic measure  Smoker: Smoker  Symptomatic bradycardia: Symptomatic bradycardia        HEALTH ISSUES - R/O PROBLEM Dx:      GARO Price NP   #[id number]

## 2019-05-01 ENCOUNTER — TRANSCRIPTION ENCOUNTER (OUTPATIENT)
Age: 54
End: 2019-05-01

## 2019-05-01 VITALS — HEART RATE: 71 BPM | SYSTOLIC BLOOD PRESSURE: 92 MMHG | RESPIRATION RATE: 33 BRPM | DIASTOLIC BLOOD PRESSURE: 57 MMHG

## 2019-05-01 DIAGNOSIS — I20.9 ANGINA PECTORIS, UNSPECIFIED: ICD-10-CM

## 2019-05-01 LAB
ALBUMIN SERPL ELPH-MCNC: 3.9 G/DL — SIGNIFICANT CHANGE UP (ref 3.3–5)
ALP SERPL-CCNC: 50 U/L — SIGNIFICANT CHANGE UP (ref 40–120)
ALT FLD-CCNC: 14 U/L — SIGNIFICANT CHANGE UP (ref 10–45)
ANION GAP SERPL CALC-SCNC: 12 MMOL/L — SIGNIFICANT CHANGE UP (ref 5–17)
AST SERPL-CCNC: 13 U/L — SIGNIFICANT CHANGE UP (ref 10–40)
B BURGDOR C6 AB SER-ACNC: NEGATIVE — SIGNIFICANT CHANGE UP
B BURGDOR IGG+IGM SER-ACNC: 0.11 INDEX — SIGNIFICANT CHANGE UP (ref 0.01–0.89)
BASOPHILS # BLD AUTO: 0 K/UL — SIGNIFICANT CHANGE UP (ref 0–0.2)
BASOPHILS NFR BLD AUTO: 0.3 % — SIGNIFICANT CHANGE UP (ref 0–2)
BILIRUB SERPL-MCNC: 0.4 MG/DL — SIGNIFICANT CHANGE UP (ref 0.2–1.2)
BUN SERPL-MCNC: 12 MG/DL — SIGNIFICANT CHANGE UP (ref 7–23)
CALCIUM SERPL-MCNC: 9.6 MG/DL — SIGNIFICANT CHANGE UP (ref 8.4–10.5)
CHLORIDE SERPL-SCNC: 104 MMOL/L — SIGNIFICANT CHANGE UP (ref 96–108)
CO2 SERPL-SCNC: 25 MMOL/L — SIGNIFICANT CHANGE UP (ref 22–31)
CREAT SERPL-MCNC: 0.76 MG/DL — SIGNIFICANT CHANGE UP (ref 0.5–1.3)
EOSINOPHIL # BLD AUTO: 0.2 K/UL — SIGNIFICANT CHANGE UP (ref 0–0.5)
EOSINOPHIL NFR BLD AUTO: 2.2 % — SIGNIFICANT CHANGE UP (ref 0–6)
GLUCOSE SERPL-MCNC: 94 MG/DL — SIGNIFICANT CHANGE UP (ref 70–99)
HCT VFR BLD CALC: 38.1 % — SIGNIFICANT CHANGE UP (ref 34.5–45)
HGB BLD-MCNC: 12.6 G/DL — SIGNIFICANT CHANGE UP (ref 11.5–15.5)
LYMPHOCYTES # BLD AUTO: 2.7 K/UL — SIGNIFICANT CHANGE UP (ref 1–3.3)
LYMPHOCYTES # BLD AUTO: 38.2 % — SIGNIFICANT CHANGE UP (ref 13–44)
MAGNESIUM SERPL-MCNC: 1.9 MG/DL — SIGNIFICANT CHANGE UP (ref 1.6–2.6)
MCHC RBC-ENTMCNC: 29.6 PG — SIGNIFICANT CHANGE UP (ref 27–34)
MCHC RBC-ENTMCNC: 33.1 GM/DL — SIGNIFICANT CHANGE UP (ref 32–36)
MCV RBC AUTO: 89.5 FL — SIGNIFICANT CHANGE UP (ref 80–100)
MONOCYTES # BLD AUTO: 0.6 K/UL — SIGNIFICANT CHANGE UP (ref 0–0.9)
MONOCYTES NFR BLD AUTO: 9.2 % — SIGNIFICANT CHANGE UP (ref 2–14)
NEUTROPHILS # BLD AUTO: 3.5 K/UL — SIGNIFICANT CHANGE UP (ref 1.8–7.4)
NEUTROPHILS NFR BLD AUTO: 50.2 % — SIGNIFICANT CHANGE UP (ref 43–77)
PHOSPHATE SERPL-MCNC: 4.1 MG/DL — SIGNIFICANT CHANGE UP (ref 2.5–4.5)
PLATELET # BLD AUTO: 196 K/UL — SIGNIFICANT CHANGE UP (ref 150–400)
POTASSIUM SERPL-MCNC: 4.1 MMOL/L — SIGNIFICANT CHANGE UP (ref 3.5–5.3)
POTASSIUM SERPL-SCNC: 4.1 MMOL/L — SIGNIFICANT CHANGE UP (ref 3.5–5.3)
PROT SERPL-MCNC: 6.2 G/DL — SIGNIFICANT CHANGE UP (ref 6–8.3)
RBC # BLD: 4.26 M/UL — SIGNIFICANT CHANGE UP (ref 3.8–5.2)
RBC # FLD: 11.8 % — SIGNIFICANT CHANGE UP (ref 10.3–14.5)
SODIUM SERPL-SCNC: 141 MMOL/L — SIGNIFICANT CHANGE UP (ref 135–145)
WBC # BLD: 7 K/UL — SIGNIFICANT CHANGE UP (ref 3.8–10.5)
WBC # FLD AUTO: 7 K/UL — SIGNIFICANT CHANGE UP (ref 3.8–10.5)

## 2019-05-01 PROCEDURE — 85610 PROTHROMBIN TIME: CPT

## 2019-05-01 PROCEDURE — 84443 ASSAY THYROID STIM HORMONE: CPT

## 2019-05-01 PROCEDURE — 93306 TTE W/DOPPLER COMPLETE: CPT

## 2019-05-01 PROCEDURE — 86850 RBC ANTIBODY SCREEN: CPT

## 2019-05-01 PROCEDURE — 80061 LIPID PANEL: CPT

## 2019-05-01 PROCEDURE — 93005 ELECTROCARDIOGRAM TRACING: CPT

## 2019-05-01 PROCEDURE — 83036 HEMOGLOBIN GLYCOSYLATED A1C: CPT

## 2019-05-01 PROCEDURE — 93458 L HRT ARTERY/VENTRICLE ANGIO: CPT

## 2019-05-01 PROCEDURE — A9500: CPT

## 2019-05-01 PROCEDURE — 99233 SBSQ HOSP IP/OBS HIGH 50: CPT | Mod: GC

## 2019-05-01 PROCEDURE — 82533 TOTAL CORTISOL: CPT

## 2019-05-01 PROCEDURE — 82553 CREATINE MB FRACTION: CPT

## 2019-05-01 PROCEDURE — 93010 ELECTROCARDIOGRAM REPORT: CPT | Mod: 76

## 2019-05-01 PROCEDURE — 99285 EMERGENCY DEPT VISIT HI MDM: CPT

## 2019-05-01 PROCEDURE — 78452 HT MUSCLE IMAGE SPECT MULT: CPT

## 2019-05-01 PROCEDURE — 86901 BLOOD TYPING SEROLOGIC RH(D): CPT

## 2019-05-01 PROCEDURE — 85730 THROMBOPLASTIN TIME PARTIAL: CPT

## 2019-05-01 PROCEDURE — 85027 COMPLETE CBC AUTOMATED: CPT

## 2019-05-01 PROCEDURE — 99152 MOD SED SAME PHYS/QHP 5/>YRS: CPT

## 2019-05-01 PROCEDURE — 82550 ASSAY OF CK (CPK): CPT

## 2019-05-01 PROCEDURE — 80048 BASIC METABOLIC PNL TOTAL CA: CPT

## 2019-05-01 PROCEDURE — 86900 BLOOD TYPING SEROLOGIC ABO: CPT

## 2019-05-01 PROCEDURE — 99152 MOD SED SAME PHYS/QHP 5/>YRS: CPT | Mod: GC

## 2019-05-01 PROCEDURE — 93458 L HRT ARTERY/VENTRICLE ANGIO: CPT | Mod: 26,GC

## 2019-05-01 PROCEDURE — 99231 SBSQ HOSP IP/OBS SF/LOW 25: CPT

## 2019-05-01 PROCEDURE — 84484 ASSAY OF TROPONIN QUANT: CPT

## 2019-05-01 PROCEDURE — 81025 URINE PREGNANCY TEST: CPT

## 2019-05-01 PROCEDURE — 83735 ASSAY OF MAGNESIUM: CPT

## 2019-05-01 PROCEDURE — C1887: CPT

## 2019-05-01 PROCEDURE — 86618 LYME DISEASE ANTIBODY: CPT

## 2019-05-01 PROCEDURE — C1894: CPT

## 2019-05-01 PROCEDURE — 84100 ASSAY OF PHOSPHORUS: CPT

## 2019-05-01 PROCEDURE — 80053 COMPREHEN METABOLIC PANEL: CPT

## 2019-05-01 PROCEDURE — C1769: CPT

## 2019-05-01 PROCEDURE — 86803 HEPATITIS C AB TEST: CPT

## 2019-05-01 RX ORDER — ASPIRIN/CALCIUM CARB/MAGNESIUM 324 MG
81 TABLET ORAL DAILY
Qty: 0 | Refills: 0 | Status: DISCONTINUED | OUTPATIENT
Start: 2019-05-01 | End: 2019-05-01

## 2019-05-01 RX ADMIN — DEXTROAMPHETAMINE SACCHARATE, AMPHETAMINE ASPARTATE, DEXTROAMPHETAMINE SULFATE AND AMPHETAMINE SULFATE 10 MILLIGRAM(S): 1.875; 1.875; 1.875; 1.875 TABLET ORAL at 09:28

## 2019-05-01 RX ADMIN — CHLORHEXIDINE GLUCONATE 1 APPLICATION(S): 213 SOLUTION TOPICAL at 04:20

## 2019-05-01 RX ADMIN — Medication 81 MILLIGRAM(S): at 12:01

## 2019-05-01 NOTE — PROGRESS NOTE ADULT - SUBJECTIVE AND OBJECTIVE BOX
Patient is a 53y old  Female who presents with a chief complaint of Symptomatic bradycardia (01 May 2019 04:35)    HPI:  This is a 53 year old female former smoker with past medical history of mitral valve prolapse, anxiety and attention deficit disorder who was transferred from [facility] ED for symptomatic bradycardia.  Patient states Friday night she was at a wedding and felt episodes of dizziness.  She endorses she drank 4 glasses of wine.  When she awoke the next day she felt extreme weakness with an episode of sustained dizziness which prompted her to go to the ED.  While in ED, she was found to be bradycardic in 30s.  Patient transferred to [facility] for further management.  Dopamine gtt at 5mcg was started with appropriate response in HR.  Patient denies any syncope.  No history of AVN blockers. Currently patient denies dizziness, lightheadedness, chest pain, palpitations, sob, abd pain, n/v/d/c.  Of note, patient states she runs about 5 miles 3-5x per week.  This week she states she was unable to run because she felt weakness with episodes of palpitations.  Patient also endorses having prior syncopal episodes in the past with ER visit for dehydration. (28 Apr 2019 04:05)    Overnight Event:    REVIEW OF SYSTEMS:  	    MEDICATIONS  (STANDING):  amphetamine/dextroamphetamine XR 10 milliGRAM(s) Oral with breakfast  chlorhexidine 4% Liquid 1 Application(s) Topical <User Schedule>  fluticasone propionate 50 MICROgram(s)/spray Nasal Spray 1 Spray(s) Both Nostrils daily  melatonin 5 milliGRAM(s) Oral at bedtime  sodium chloride 0.9%. 1000 milliLiter(s) (100 mL/Hr) IV Continuous <Continuous>    MEDICATIONS  (PRN):  acetaminophen   Tablet .. 650 milliGRAM(s) Oral every 6 hours PRN Moderate Pain (4 - 6)  LORazepam     Tablet 0.25 milliGRAM(s) Oral daily PRN Anxiety        PHYSICAL EXAM:  Vital Signs Last 24 Hrs  T(C): 36.8 (01 May 2019 05:00), Max: 36.8 (01 May 2019 05:00)  T(F): 98.3 (01 May 2019 05:00), Max: 98.3 (01 May 2019 05:00)  HR: 49 (01 May 2019 06:00) (43 - 90)  BP: 92/53 (01 May 2019 06:00) (72/40 - 154/88)  BP(mean): 70 (01 May 2019 06:00) (52 - 97)  RR: 16 (01 May 2019 06:00) (15 - 45)  SpO2: 96% (01 May 2019 06:00) (95% - 100%)  I&O's Summary    30 Apr 2019 07:01  -  01 May 2019 07:00  --------------------------------------------------------  IN: 1300 mL / OUT: 500 mL / NET: 800 mL        Appearance: Normal	  HEENT:   Normal oral mucosa, PERRL, EOMI	  Lymphatic: No lymphadenopathy  Cardiovascular: Normal S1 S2, No JVD, No murmurs, No edema  Respiratory: Lungs clear to auscultation	  Psychiatry: A & O x 3, Mood & affect appropriate  Gastrointestinal:  Soft, Non-tender, + BS	  Skin: No rashes, No ecchymoses, No cyanosis	  Neurologic: Non-focal  Extremities: Normal range of motion, No clubbing, cyanosis or edema  Vascular: Peripheral pulses palpable 2+ bilaterally    LABS:	 	                        12.6   7.0   )-----------( 196      ( 01 May 2019 04:22 )             38.1     Auto Eosinophil # 0.2   / Auto Eosinophil % 2.2   / Auto Neutrophil # 3.5   / Auto Neutrophil % 50.2  / BANDS % x                            14.1   8.1   )-----------( 177      ( 30 Apr 2019 06:02 )             39.9     Auto Eosinophil # x     / Auto Eosinophil % x     / Auto Neutrophil # x     / Auto Neutrophil % x     / BANDS % x        INR: 1.01 ratio (04-28 @ 03:11)  INR: 0.96 ratio (04-27 @ 21:11)    05-01    141  |  104  |  12  ----------------------------<  94  4.1   |  25  |  0.76  04-30    140  |  104  |  11  ----------------------------<  96  4.3   |  25  |  0.72    Ca    9.6      01 May 2019 04:22  Mg     1.9     05-01  Phos  4.1     05-01  TPro  6.2  /  Alb  3.9  /  TBili  0.4  /  DBili  x   /  AST  13  /  ALT  14  /  AlkPhos  50  05-01  TPro  6.8  /  Alb  4.0  /  TBili  0.5  /  DBili  x   /  AST  14  /  ALT  15  /  AlkPhos  55  04-30        proBNP: Serum Pro-Brain Natriuretic Peptide: 53 pg/mL (04-27 @ 21:11)    Lipid Profile: 04-28 Chol 185 <H> HDL 53 Trig 89  HgA1c: 5.4 % (04-28 @ 09:37)    TSH: Thyroid Stimulating Hormone, Serum: 2.56 uIU/mL (04-28 @ 09:46)      CARDIAC MARKERS:   30 Apr 2019 06:02 Troponin x     / Creatine Kinase 50 U/L /  CKMB 1.2 ng/mL / CPK Mass Assay % x       28 Apr 2019 03:11 Troponin x     / Creatine Kinase x     /  CKMB 1.5 ng/mL / CPK Mass Assay % x            TELEMETRY: 	    ECG:  	  RADIOLOGY:  OTHER: 	    PREVIOUS DIAGNOSTIC TESTING:    [ ] Echocardiogram:  [ ]  Catheterization:  [ ] Stress Test:  	  	  ANNIE Manzanares  Contact # Patient is a 53y old  Female who presents with a chief complaint of Symptomatic bradycardia (01 May 2019 04:35)    HPI:  This is a 53 year old female former smoker with past medical history of mitral valve prolapse, anxiety and attention deficit disorder who was transferred from [facility] ED for symptomatic bradycardia.  Patient states Friday night she was at a wedding and felt episodes of dizziness.  She endorses she drank 4 glasses of wine.  When she awoke the next day she felt extreme weakness with an episode of sustained dizziness which prompted her to go to the ED.  While in ED, she was found to be bradycardic in 30s.  Patient transferred to [facility] for further management.  Dopamine gtt at 5mcg was started with appropriate response in HR.  Patient denies any syncope.  No history of AVN blockers. Currently patient denies dizziness, lightheadedness, chest pain, palpitations, sob, abd pain, n/v/d/c.  Of note, patient states she runs about 5 miles 3-5x per week.  This week she states she was unable to run because she felt weakness with episodes of palpitations.  Patient also endorses having prior syncopal episodes in the past with ER visit for dehydration. (28 Apr 2019 04:05)    Overnight Event: SBP 70's felt lightheaded and gave 250cc NS x1 with improvement    REVIEW OF SYSTEMS: no SOB, chest pain or palpitations  	    MEDICATIONS  (STANDING):  amphetamine/dextroamphetamine XR 10 milliGRAM(s) Oral with breakfast  chlorhexidine 4% Liquid 1 Application(s) Topical <User Schedule>  fluticasone propionate 50 MICROgram(s)/spray Nasal Spray 1 Spray(s) Both Nostrils daily  melatonin 5 milliGRAM(s) Oral at bedtime  sodium chloride 0.9%. 1000 milliLiter(s) (100 mL/Hr) IV Continuous <Continuous>    MEDICATIONS  (PRN):  acetaminophen   Tablet .. 650 milliGRAM(s) Oral every 6 hours PRN Moderate Pain (4 - 6)  LORazepam     Tablet 0.25 milliGRAM(s) Oral daily PRN Anxiety        PHYSICAL EXAM:  Vital Signs Last 24 Hrs  T(C): 36.8 (01 May 2019 05:00), Max: 36.8 (01 May 2019 05:00)  T(F): 98.3 (01 May 2019 05:00), Max: 98.3 (01 May 2019 05:00)  HR: 49 (01 May 2019 06:00) (43 - 90)  BP: 92/53 (01 May 2019 06:00) (72/40 - 154/88)  BP(mean): 70 (01 May 2019 06:00) (52 - 97)  RR: 16 (01 May 2019 06:00) (15 - 45)  SpO2: 96% (01 May 2019 06:00) (95% - 100%)  I&O's Summary    30 Apr 2019 07:01  -  01 May 2019 07:00  --------------------------------------------------------  IN: 1300 mL / OUT: 500 mL / NET: 800 mL      Appearance: NAD  HEENT:   Normal oral mucosa, PERRL, EOMI	  Cardiovascular: Normal S1 S2, No JVD, No murmurs, No edema  Respiratory: Lungs clear to auscultation	  Psychiatry: A & O x 3, Mood & affect appropriate  Gastrointestinal:  Soft, Non-tender, + BS	  Skin: No rashes, No ecchymoses, No cyanosis	  Neurologic: Non-focal  Extremities: Normal range of motion, No clubbing, cyanosis or edema  Vascular: Peripheral pulses palpable 2+ bilaterally    LABS:	 	                        12.6   7.0   )-----------( 196      ( 01 May 2019 04:22 )             38.1     Auto Eosinophil # 0.2   / Auto Eosinophil % 2.2   / Auto Neutrophil # 3.5   / Auto Neutrophil % 50.2  / BANDS % x                            14.1   8.1   )-----------( 177      ( 30 Apr 2019 06:02 )             39.9     Auto Eosinophil # x     / Auto Eosinophil % x     / Auto Neutrophil # x     / Auto Neutrophil % x     / BANDS % x        INR: 1.01 ratio (04-28 @ 03:11)  INR: 0.96 ratio (04-27 @ 21:11)    05-01    141  |  104  |  12  ----------------------------<  94  4.1   |  25  |  0.76  04-30    140  |  104  |  11  ----------------------------<  96  4.3   |  25  |  0.72    Ca    9.6      01 May 2019 04:22  Mg     1.9     05-01  Phos  4.1     05-01  TPro  6.2  /  Alb  3.9  /  TBili  0.4  /  DBili  x   /  AST  13  /  ALT  14  /  AlkPhos  50  05-01  TPro  6.8  /  Alb  4.0  /  TBili  0.5  /  DBili  x   /  AST  14  /  ALT  15  /  AlkPhos  55  04-30    proBNP: Serum Pro-Brain Natriuretic Peptide: 53 pg/mL (04-27 @ 21:11)    Lipid Profile: 04-28 Chol 185 <H> HDL 53 Trig 89  HgA1c: 5.4 % (04-28 @ 09:37)    TSH: Thyroid Stimulating Hormone, Serum: 2.56 uIU/mL (04-28 @ 09:46)    CARDIAC MARKERS:   30 Apr 2019 06:02 Troponin x     / Creatine Kinase 50 U/L /  CKMB 1.2 ng/mL / CPK Mass Assay % x       28 Apr 2019 03:11 Troponin x     / Creatine Kinase x     /  CKMB 1.5 ng/mL / CPK Mass Assay % x          TELEMETRY: 	no ectopy    ECG:  	< from: 12 Lead ECG (05.01.19 @ 09:01) >  SINUS BRADYCARDIA  SEPTAL INFARCT , AGE UNDETERMINED  ABNORMAL ECG  	    PREVIOUS DIAGNOSTIC TESTING:    [ ] Echocardiogram: < from: Transthoracic Echocardiogram (04.28.19 @ 08:02) >  Conclusions:  1. Normal left ventricular internal dimensions and wall  thicknesses.  2. Hyperdynamic left ventricular systolic function.  3. Normal right ventricular size and function.    < end of copied text >    [ ]  Catheterization:  [ ] Stress Test:  	< from: Nuclear Myocardial-Rest Study (04.30.19 @ 16:54) >  IMPRESSIONS:Abnormal Study  * Rest-only myocardial perfusion  imaging/radiopharmaceutical injection performed; stress  injection/imaging not performed as patient did not achieve  target heart rate (achieved a peak HR of 108 bpm in Stage  2 of Tarun Protocol at 3:58 min of exercise).  * Submaximal Exercise Stress Test; patient did not achieve  target heart rate.  * Chest Pain: No chest pain with exercise.  * Symptom: Weakness (patient requested stopping exercise)  * HR Response: Appropriate.  * BP Response: Appropriate.  * Heart Rhythm: Sinus Rhythm - 70 BPM.  * Q Waves: aVL.  * Baseline ECG: Nonspecific ST-T wave abnormality.  * ECG Changes: No significant ischemic ST segment changes  beyond baseline abnormalities at 65% of MPHR.  * Arrhythmia: None.  * Review of raw data shows: Minor motion artifact.  * There are mild to moderate defects in the anterior,  septal, apical, basal and distal inferior, and distal  inferolateral walls.  Attenuation artifact may be    < end of copied text >    	  TERRANCE ManzanaresWashington [facility] Hospital  Contact # [phone]

## 2019-05-01 NOTE — PROGRESS NOTE ADULT - SUBJECTIVE AND OBJECTIVE BOX
HPI: states felt heart rate go down this morning  felt like she was falling     MEDICATIONS  (STANDING):  amphetamine/dextroamphetamine XR 10 milliGRAM(s) Oral with breakfast  aspirin enteric coated 81 milliGRAM(s) Oral daily  chlorhexidine 4% Liquid 1 Application(s) Topical <User Schedule>  fluticasone propionate 50 MICROgram(s)/spray Nasal Spray 1 Spray(s) Both Nostrils daily  melatonin 5 milliGRAM(s) Oral at bedtime  sodium chloride 0.9%. 1000 milliLiter(s) (100 mL/Hr) IV Continuous <Continuous>    MEDICATIONS  (PRN):  acetaminophen   Tablet .. 650 milliGRAM(s) Oral every 6 hours PRN Moderate Pain (4 - 6)  LORazepam     Tablet 0.25 milliGRAM(s) Oral daily PRN Anxiety    Allergies lidocaine (Anaphylaxis)    REVIEW OF SYSTEM:    Constitutional: denies fever, chills, fatigue  Neuro: denies headache, numbness, weakness, dizziness  Resp: denies cough, wheezing, shortness of breath  CVS: denies chest pain, palpitations, leg swelling  GI: denies abdominal pain, nausea, vomiting, diarrhea   : denies dysuria, frequency, incontinence  Skin: denies itching, burning, rashes, or lesions   Msk: denies joint pain or swelling    Vital Signs Last 24 Hrs  T(C): 36.8 (01 May 2019 05:00), Max: 36.8 (01 May 2019 05:00)  T(F): 98.3 (01 May 2019 05:00), Max: 98.3 (01 May 2019 05:00)  HR: 59 (01 May 2019 10:00) (47 - 90)  BP: 96/51 (01 May 2019 10:00) (72/40 - 154/88)  BP(mean): 70 (01 May 2019 10:00) (52 - 95)  RR: 31 (01 May 2019 10:00) (15 - 45)  SpO2: 100% (01 May 2019 07:00) (95% - 100%)    Physical Exam:  General : well developed, well nourished,  and no acute distress  Neuro : Alert and oriented x 3, no focal deficits  HEENT : Sclera clear, no JVD, carotid bruits, neck supple  Lungs:  Clear to Ascultation, no wheezing , rales or rhonchi   Cardiovascular : + 1 +2, RRR, no murmurs, no rubs  Extremities : No edema, + 1 DP and +1 PT, feet warm   Skin : warm dry     TELE: SB 46 - 60 's over night , 50 - 90 while awake   EKG: SB 53 bpm     LABS:                        12.6   7.0   )-----------( 196      ( 01 May 2019 04:22 )             38.1     05-01    141  |  104  |  12  ----------------------------<  94  4.1   |  25  |  0.76    Ca    9.6      01 May 2019 04:22  Phos  4.1     05-01  Mg     1.9     05-01    TPro  6.2  /  Alb  3.9  /  TBili  0.4  /  DBili  x   /  AST  13  /  ALT  14  /  AlkPhos  50  05-01    RADIOLOGY & ADDITIONAL TESTS:  < from: Nuclear Myocardial-Rest Study (04.30.19 @ 16:54) >  TYPE OF TEST: Rest SESTAMIBI  INDICATION: Abnormal electrocardiogram (ECG) (EKG)  (R94.31), Dyspnea, unspecified (R06.00)  ------------------------------------------------------------------------  CARDIAC HISTORY:  53 year old woman with PMHx of mitral valve prolapse,  anxiety, childhood fainting episodes(dehydration?)  presents for ischemic evaluation for near syncopal episode  and abnormal EKG.    < end of copied text >  < from: Nuclear Myocardial-Rest Study (04.30.19 @ 16:54) >  IMPRESSIONS:Abnormal Study  * Rest-only myocardial perfusion  imaging/radiopharmaceutical injection performed; stress  injection/imaging not performed as patient did not achieve  target heart rate (achieved a peak HR of 108 bpm in Stage  2 of Tarun Protocol at 3:58 min of exercise).  * Submaximal Exercise Stress Test; patient did not achieve  target heart rate.  * Chest Pain: No chest pain with exercise.  * Symptom: Weakness (patient requested stopping exercise)  * HR Response: Appropriate.  * BP Response: Appropriate.  * Heart Rhythm: Sinus Rhythm - 70 BPM.  * Q Waves: aVL.  * Baseline ECG: Nonspecific ST-T wave abnormality.  * ECG Changes: No significant ischemic ST segment changes  beyond baseline abnormalities at 65% of MPHR.  * Arrhythmia: None.  * Review of raw data shows: Minor motion artifact.  * There are mild to moderate defects in the anterior,  septal, apical, basal and distal inferior, and distal  inferolateral walls.  Attenuation artifact may be  contributing to these defects.  *** No previous Nuclear/Stress exam.  ------------------------------------------------------------------------  Confirmed on  4/30/2019 - 17:08:54 by Nicko Law M.D.  ------------------------------------------------------------------------ HPI: states felt heart rate go down this morning  felt like she was falling     MEDICATIONS  (STANDING):  amphetamine/dextroamphetamine XR 10 milliGRAM(s) Oral with breakfast  aspirin enteric coated 81 milliGRAM(s) Oral daily  chlorhexidine 4% Liquid 1 Application(s) Topical <User Schedule>  fluticasone propionate 50 MICROgram(s)/spray Nasal Spray 1 Spray(s) Both Nostrils daily  melatonin 5 milliGRAM(s) Oral at bedtime  sodium chloride 0.9%. 1000 milliLiter(s) (100 mL/Hr) IV Continuous <Continuous>    MEDICATIONS  (PRN):  acetaminophen   Tablet .. 650 milliGRAM(s) Oral every 6 hours PRN Moderate Pain (4 - 6)  LORazepam     Tablet 0.25 milliGRAM(s) Oral daily PRN Anxiety    Allergies lidocaine (Anaphylaxis)    REVIEW OF SYSTEM:    Constitutional: denies fever, chills, fatigue  Neuro: denies headache, numbness, weakness, dizziness  Resp: denies cough, wheezing, shortness of breath  CVS: denies chest pain, palpitations, leg swelling  GI: denies abdominal pain, nausea, vomiting, diarrhea   : denies dysuria, frequency, incontinence  Skin: denies itching, burning, rashes, or lesions   Msk: denies joint pain or swelling    Vital Signs Last 24 Hrs  T(C): 36.8 (01 May 2019 05:00), Max: 36.8 (01 May 2019 05:00)  T(F): 98.3 (01 May 2019 05:00), Max: 98.3 (01 May 2019 05:00)  HR: 59 (01 May 2019 10:00) (47 - 90)  BP: 96/51 (01 May 2019 10:00) (72/40 - 154/88)  BP(mean): 70 (01 May 2019 10:00) (52 - 95)  RR: 31 (01 May 2019 10:00) (15 - 45)  SpO2: 100% (01 May 2019 07:00) (95% - 100%)    Physical Exam:  General : well developed, well nourished,  and no acute distress  Neuro : Alert and oriented x 3, no focal deficits  HEENT : Sclera clear, no JVD, carotid bruits, neck supple  Lungs:  Clear to Ascultation, no wheezing , rales or rhonchi   Cardiovascular : + 1 +2, RRR, no murmurs, no rubs  Extremities : No edema, + 1 DP and +1 PT, feet warm   Skin : warm dry     TELE: SB 46 - 60 's over night , 50 - 90 while awake   EKG: SB 53 bpm     LABS:                        12.6   7.0   )-----------( 196      ( 01 May 2019 04:22 )             38.1     05-01    141  |  104  |  12  ----------------------------<  94  4.1   |  25  |  0.76    Ca    9.6      01 May 2019 04:22  Phos  4.1     05-01  Mg     1.9     05-01    TPro  6.2  /  Alb  3.9  /  TBili  0.4  /  DBili  x   /  AST  13  /  ALT  14  /  AlkPhos  50  05-01    RADIOLOGY & ADDITIONAL TESTS:  < from: Nuclear Myocardial-Rest Study (04.30.19 @ 16:54) >  TYPE OF TEST: Rest SESTAMIBI  INDICATION: Abnormal electrocardiogram (ECG) (EKG)  (R94.31), Dyspnea, unspecified (R06.00)  ------------------------------------------------------------------------  CARDIAC HISTORY:  53 year old woman with PMHx of mitral valve prolapse,  anxiety, childhood fainting episodes(dehydration?)  presents for ischemic evaluation for near syncopal episode  and abnormal EKG.    < end of copied text >  < from: Nuclear Myocardial-Rest Study (04.30.19 @ 16:54) >  IMPRESSIONS:Abnormal Study  * Rest-only myocardial perfusion  imaging/radiopharmaceutical injection performed; stress  injection/imaging not performed as patient did not achieve  target heart rate (achieved a peak HR of 108 bpm in Stage  2 of Tarun Protocol at 3:58 min of exercise).  * Submaximal Exercise Stress Test; patient did not achieve  target heart rate.  * Chest Pain: No chest pain with exercise.  * Symptom: Weakness (patient requested stopping exercise)  * HR Response: Appropriate.  * BP Response: Appropriate.  * Heart Rhythm: Sinus Rhythm - 70 BPM.  * Q Waves: aVL.  * Baseline ECG: Nonspecific ST-T wave abnormality.  * ECG Changes: No significant ischemic ST segment changes  beyond baseline abnormalities at 65% of MPHR.  * Arrhythmia: None.  * Review of raw data shows: Minor motion artifact.  * There are mild to moderate defects in the anterior,  septal, apical, basal and distal inferior, and distal  inferolateral walls.  Attenuation artifact may be  contributing to these defects.  *** No previous Nuclear/Stress exam.  ------------------------------------------------------------------------  Confirmed on  4/30/2019 - 17:08:54 by Nicko Law M.D.  ------------------------------------------------------------------------    < from: Transthoracic Echocardiogram (04.28.19 @ 08:02) >  Dimensions:    Normal Values:  LA:     3.1    2.0 - 4.0 cm  Ao:     3.0    2.0 - 3.8 cm  SEPTUM: 0.9    0.6 - 1.2 cm  PWT:    0.9  0.6 - 1.1 cm  LVIDd:  4.7    3.0 - 5.6 cm  LVIDs:  2.2    1.8 - 4.0 cm  Derived variables:  LVMI: 82 g/m2  RWT: 0.38  Fractional short: 53 %  EF (Moriaholtz): 84 %  ----------------------------------------------------    < end of copied text >  < from: Transthoracic Echocardiogram (04.28.19 @ 08:02) >  ------------------------------------------------------------------------  Conclusions:  1. Normal left ventricular internal dimensions and wall  thicknesses.  2. Hyperdynamic left ventricular systolic function.  3. Normal right ventricular size and function.  *** No previous Echo exam.  ------------------------------------------------------------------------  Confirmed on  4/28/2019 - 16:22:48 by Tasha Garcia M.D.  ------------------------------------------------------------------------    < end of copied text >

## 2019-05-01 NOTE — PROGRESS NOTE ADULT - SUBJECTIVE AND OBJECTIVE BOX
CCU 2 MIDNIGHT ROUNDS    Follow Up:  Consult    HPI:  This is a 53 year old female former smoker with past medical history of mitral valve prolapse, anxiety and attention deficit disorder who was transferred from [facility] ED for symptomatic bradycardia.  Patient states Friday night she was at a wedding and felt episodes of dizziness.  She endorses she drank 4 glasses of wine.  When she awoke the next day she felt extreme weakness with an episode of sustained dizziness which prompted her to go to the ED.  While in ED, she was found to be bradycardic in 30s.  Patient transferred to [facility] for further management.  Dopamine gtt at 5mcg was started with appropriate response in HR.  Patient denies any syncope.  No history of AVN blockers. Currently patient denies dizziness, lightheadedness, chest pain, palpitations, sob, abd pain, n/v/d/c.  Of note, patient states she runs about 5 miles 3-5x per week.  This week she states she was unable to run because she felt weakness with episodes of palpitations.  Patient also endorses having prior syncopal episodes in the past with ER visit for dehydration. (28 Apr 2019 04:05)        Subjective/Observations: Pt. seen and examined and evaluated. Pt. resting comfortably in bed in NAD, with no respiratory distress, no chest pain, dyspnea, palpitations, PND, or orthopnea.      REVIEW OF SYSTEMS: All other review of systems is negative unless indicated above    PAST MEDICAL & SURGICAL HISTORY:  Seasonal allergies  Anxiety  ADD (attention deficit disorder)  Former smoker  Fainting  MVP (mitral valve prolapse)  History of tonsillectomy  H/O tubal ligation      MEDICATIONS  (STANDING):  amphetamine/dextroamphetamine XR 10 milliGRAM(s) Oral with breakfast  chlorhexidine 4% Liquid 1 Application(s) Topical <User Schedule>  fluticasone propionate 50 MICROgram(s)/spray Nasal Spray 1 Spray(s) Both Nostrils daily  melatonin 5 milliGRAM(s) Oral at bedtime  sodium chloride 0.9%. 1000 milliLiter(s) (100 mL/Hr) IV Continuous <Continuous>    MEDICATIONS  (PRN):  acetaminophen   Tablet .. 650 milliGRAM(s) Oral every 6 hours PRN Moderate Pain (4 - 6)  LORazepam     Tablet 0.25 milliGRAM(s) Oral daily PRN Anxiety      Allergies: lidocaine (Anaphylaxis)    Intolerances          Vital Signs Last 24 Hrs  T(C): 36.7 (30 Apr 2019 19:00), Max: 36.7 (30 Apr 2019 11:30)  T(F): 98.1 (30 Apr 2019 19:00), Max: 98.1 (30 Apr 2019 19:00)  HR: 54 (01 May 2019 04:00) (43 - 90)  BP: 96/53 (01 May 2019 04:00) (72/40 - 154/88)  BP(mean): 72 (01 May 2019 04:00) (52 - 97)  RR: 17 (01 May 2019 04:00) (15 - 45)  SpO2: 98% (01 May 2019 04:00) (95% - 100%)    I&O's Summary    29 Apr 2019 07:01  -  30 Apr 2019 07:00  --------------------------------------------------------  IN: 1277.6 mL / OUT: 500 mL / NET: 777.6 mL    30 Apr 2019 07:01  -  01 May 2019 04:36  --------------------------------------------------------  IN: 1300 mL / OUT: 500 mL / NET: 800 mL              LABS: All Labs Reviewed:                           Echo:  < from: Transthoracic Echocardiogram (04.28.19 @ 08:02) >  Observations:  Mitral Valve: Normal mitral valve.  Aortic Valve/Aorta: Normal trileaflet aortic valve.  Aortic Root: 3 cm.  Left Atrium: LA volume index = 15 cc/m2.  Left Ventricle: Hyperdynamic left ventricular systolic  function. Normal left ventricular internal dimensions and  wall thicknesses. Normal diastolic function  Right Heart: Normal right atrium. Normal right ventricular  size and function. Normal tricuspid valve. Pulmonic valve  not well visualized.  Pericardium/Pleura: Normal pericardium with no pericardial  effusion.  Hemodynamic: Estimated right atrial pressure is 8 mm Hg.  ------------------------------------------------------------------------  Conclusions:  1. Normal left ventricular internal dimensions and wall  thicknesses.  2. Hyperdynamic left ventricular systolic function.  3. Normal right ventricular size and function.  *** No previous Echo exam.        Stress Testing:   < from: Nuclear Myocardial-Rest Study (04.30.19 @ 16:54) >  IMPRESSIONS:Abnormal Study  * Rest-only myocardial perfusion  imaging/radiopharmaceutical injection performed; stress  injection/imaging not performed as patient did not achieve  target heart rate (achieved a peak HR of 108 bpm in Stage  2 of Tarun Protocol at 3:58 min of exercise).  * Submaximal Exercise Stress Test; patient did not achieve  target heart rate.  * Chest Pain: No chest pain with exercise.  * Symptom: Weakness (patient requested stopping exercise)  * HR Response: Appropriate.  * BP Response: Appropriate.  * Heart Rhythm: Sinus Rhythm - 70 BPM.  * Q Waves: aVL.  * Baseline ECG: Nonspecific ST-T wave abnormality.  * ECG Changes: No significant ischemic ST segment changes  beyond baseline abnormalities at 65% of MPHR.  * Arrhythmia: None.  * Review of raw data shows: Minor motion artifact.  * There are mild to moderate defects in the anterior,  septal, apical, basal and distal inferior, and distal  inferolateral walls.  Attenuation artifact may be  contributing to these defects.  *** No previous Nuclear/Stress exam.        Physical Exam:  Appearance: [ ] Normal  [ ] abnormal [X ] NAD   Eyes: [ ] PERRL [ ] EOMI  HEENT: [ ] Normal [ ] Abnormal oral mucosa [ ]NC/AT  Cardiovascular: [X] S1 [X] S2 [ ] RRR [ ] m/r/g [ ]edema [ ] JVP  Procedural Access Site: [ ]  hematoma [ ] tender to palpation [ ] 2+ pulse [ ] bruit [ ] Ecchymosis  Respiratory: [X] Clear to auscultation bilaterally  Gastrointestinal: [ ] Soft [ ] tenderness[ ] distension [ ] BS  Musculoskeletal: [ ] clubbing [ ] joint deformity   Neurologic: [ ] Non-focal  Lymphatic: [ ] lymphadenopathy  Psychiatry: [X] AAOx3  [ ] confused [ ] disoriented [ ] Mood & affect appropriate  Skin: [ ]  rashes [ ] ecchymoses [ ] cyanosis

## 2019-05-01 NOTE — PROGRESS NOTE ADULT - ASSESSMENT
54y/o female h/o MVP, NL LVEF, active runner p/w lightheadedness & found to be sinus brooke with pauses/sinus arrest & placed on dopamine. Overnight, telemetry shows sinus with HR trend between 50-70's bpm. s/p Dopamine 4/30, HR's between 50-60's at rest. 54y/o female recent former smoker, active runner 2 -3 days week x 1.5 years, pmhx ADHD, MVP , anxiety, ECHO EF 53 % transfer from OSH with  lightheadedness, bradycardia to 30's and sinus pause captured on EKG, s/p Dopamine infusion  4/30, now discontinued, HR's between 50-60's at rest. S/p abnormal stress 4/30. 52y/o female recent former smoker, active runner 2 -3 days week x 1.5 years, pmhx ADHD, MVP , anxiety, TTE normal LV transfer from [facility]  with  lightheadedness, bradycardia to 30's and sinus pause captured on EKG, s/p Dopamine infusion  4/30, now discontinued, HR's between 50-60's at rest. S/p abnormal stress 4/30.

## 2019-05-01 NOTE — PROGRESS NOTE ADULT - PROBLEM SELECTOR PLAN 1
Would observe overnight and if no significant brooke overnight can send home with outpt monitor patch

## 2019-05-01 NOTE — PROGRESS NOTE ADULT - PROBLEM SELECTOR PLAN 1
HR noted for 46 to 60 over night to 90 's with activity, no pauses noted   s/p rest only myocardial perfusion study 4/30, abnormal  as noted above patient for cath today   plans for ZIO patch XT prior to discharge, monitor for bradycardia     [id number] HR noted for 46 to 60 over night to 90 's with activity, no pauses noted   lyme titre negative, orthostatics negative, TSH and cortisol WNL   s/p rest only myocardial perfusion study 4/30, abnormal, poor exercise tolerance and defects noted patient for cath today   plans for ZIO patch XT prior to discharge, monitor for bradycardia   EP will follow   [id number]

## 2019-05-01 NOTE — DISCHARGE NOTE PROVIDER - NSDCFUADDINST_GEN_ALL_CORE_FT
No heavy lifting or pushing/pulling with procedure arm for 2 weeks. No driving for 2 days. You may shower 24 hours following the procedure but avoid baths/swimming for 1 week. Check your wrist site for bleeding and/or swelling daily following procedure and call your doctor immediately if it occurs or if you experience increased pain at the site. Follow up with your cardiologist in 1-2 weeks. You may call [facility] if you have any questions/concerns regarding your procedure [phone].

## 2019-05-01 NOTE — PROGRESS NOTE ADULT - REASON FOR ADMISSION
Symptomatic bradycardia

## 2019-05-01 NOTE — PROGRESS NOTE ADULT - ASSESSMENT
52 y/o F with past medical history of MVP, NL LVEF, active runner p/w lightheadedness admitted for sinus brooke with pauses/sinus arrest & placed on dopamine. s/p Stress Test with concern for CAD

## 2019-05-01 NOTE — PROGRESS NOTE ADULT - PROBLEM SELECTOR PLAN 1
observe off dopamine  plan for home with remote telemetry monitor observe off dopamine  plan for home with remote telemetry monitor if cors are clear

## 2019-05-01 NOTE — PROGRESS NOTE ADULT - PROBLEM SELECTOR PROBLEM 1
Symptomatic bradycardia

## 2019-05-01 NOTE — DISCHARGE NOTE PROVIDER - CARE PROVIDER_API CALL
Salazar King)  Cardiac Electrophysiology; Cardiology  [address] [place] [address]  Phone: [phone]  Fax: [phone]  Follow Up Time:

## 2019-05-01 NOTE — PROGRESS NOTE ADULT - ATTENDING COMMENTS
Patient is seen and examined with fellow, NP and the CCU house-staff. I agree with the history, physical and the assessment and plan.  positive stress test - will start low dose ASA and arrange for cardiac cath  off of dopamine with HR in the 50s

## 2019-05-01 NOTE — DISCHARGE NOTE PROVIDER - NSDCCPCAREPLAN_GEN_ALL_CORE_FT
PRINCIPAL DISCHARGE DIAGNOSIS  Diagnosis: Symptomatic bradycardia  Assessment and Plan of Treatment: ZIO patch XT prior to discharge, monitor for bradycardia   follow up with Dr. King

## 2019-05-01 NOTE — PROGRESS NOTE ADULT - ASSESSMENT
This is a 53 year old woman with past medical history of MVP, NL LVEF, active runner p/w lightheadedness & found to be sinus brooke with pauses/sinus arrest & placed on dopamine. Overnight, telemetry shows sinus with HR trend between 50-70's bpm. Dopamine d/c'd with HR's between 50-60's at rest.  S/P stress test for ischemic evaluation heart rate peaked at 100's bpm but aborted prematurely. At bedside with minimal jogging in pace patient had heart go from SR 60-70's bpm to 110's bpm. No indication for PPM at this time. Would observe overnight and if no significant brooke overnight can send home with outpt monitor patch This is a 53 year old woman with past medical history of MVP, NL LVEF, active runner p/w lightheadedness & found to be sinus brooke with pauses/sinus arrest & placed on dopamine. Overnight, telemetry shows sinus with HR trend between 50-70's bpm. Dopamine d/c'd with HR's between 50-60's at rest.  S/P stress test for ischemic evaluation heart rate peaked at 100's bpm but aborted prematurely. At bedside with minimal jogging in pace patient had heart go from SR 60-70's bpm to 110's bpm. No indication for PPM at this time.

## 2019-05-01 NOTE — DISCHARGE NOTE PROVIDER - HOSPITAL COURSE
53F former smoker PMH MVP, ADD, anxiety transferred from [facility] VS symptomatic bradycardia.  States Friday night she was at a wedding and felt episodes of dizziness.  She endorses she drank 4 glasses of wine.  When she awoke the next day she felt extreme weakness with an episode of sustained dizziness - to ED - found bradycardic in 30s. -[facility] for further mgmt.  Dopa gtt at 5mcg was started with appropriate response in HR.        < from: Transthoracic Echocardiogram (04.28.19 @ 08:02) >        EF (Teicholtz): 84 %    ------------------------------------------------------------------------    Observations:    Mitral Valve: Normal mitral valve.    Aortic Valve/Aorta: Normal trileaflet aortic valve.    Aortic Root: 3 cm.    Left Atrium: LA volume index = 15 cc/m2.    Left Ventricle: Hyperdynamic left ventricular systolic    function. Normal left ventricular internal dimensions and    wall thicknesses. Normal diastolic function    Right Heart: Normal right atrium. Normal right ventricular    size and function. Normal tricuspid valve. Pulmonic valve    not well visualized.    Pericardium/Pleura: Normal pericardium with no pericardial    effusion.    Hemodynamic: Estimated right atrial pressure is 8 mm Hg.    ------------------------------------------------------------------------    Conclusions:    1. Normal left ventricular internal dimensions and wall    thicknesses.    2. Hyperdynamic left ventricular systolic function.    3. Normal right ventricular size and function.        < end of copied text >

## 2019-05-01 NOTE — DISCHARGE NOTE NURSING/CASE MANAGEMENT/SOCIAL WORK - NSDCDPATPORTLINK_GEN_ALL_CORE
You can access the [facility] Patient Portal, offered by [facility], by registering with the following website: [email or web address]

## 2019-05-02 PROBLEM — F41.9 ANXIETY DISORDER, UNSPECIFIED: Chronic | Status: ACTIVE | Noted: 2019-04-28

## 2019-05-02 PROBLEM — F98.8 OTHER SPECIFIED BEHAVIORAL AND EMOTIONAL DISORDERS WITH ONSET USUALLY OCCURRING IN CHILDHOOD AND ADOLESCENCE: Chronic | Status: ACTIVE | Noted: 2019-04-28

## 2019-05-02 PROBLEM — J30.2 OTHER SEASONAL ALLERGIC RHINITIS: Chronic | Status: ACTIVE | Noted: 2019-04-28

## 2019-05-02 PROBLEM — Z87.891 PERSONAL HISTORY OF NICOTINE DEPENDENCE: Chronic | Status: ACTIVE | Noted: 2019-04-28

## 2019-05-03 ENCOUNTER — INBOUND DOCUMENT (OUTPATIENT)
Age: 54
End: 2019-05-03

## 2019-05-22 PROBLEM — I34.1 MITRAL VALVE PROLAPSE SYNDROME: Status: ACTIVE | Noted: 2019-05-22

## 2019-05-23 ENCOUNTER — NON-APPOINTMENT (OUTPATIENT)
Age: 54
End: 2019-05-23

## 2019-05-23 ENCOUNTER — TRANSCRIPTION ENCOUNTER (OUTPATIENT)
Age: 54
End: 2019-05-23

## 2019-05-23 ENCOUNTER — APPOINTMENT (OUTPATIENT)
Dept: ELECTROPHYSIOLOGY | Facility: CLINIC | Age: 54
End: 2019-05-23
Payer: COMMERCIAL

## 2019-05-23 VITALS
SYSTOLIC BLOOD PRESSURE: 113 MMHG | OXYGEN SATURATION: 98 % | HEIGHT: 64 IN | BODY MASS INDEX: 26.29 KG/M2 | DIASTOLIC BLOOD PRESSURE: 76 MMHG | WEIGHT: 154 LBS | HEART RATE: 66 BPM

## 2019-05-23 DIAGNOSIS — Z80.42 FAMILY HISTORY OF MALIGNANT NEOPLASM OF PROSTATE: ICD-10-CM

## 2019-05-23 DIAGNOSIS — Z81.8 FAMILY HISTORY OF OTHER MENTAL AND BEHAVIORAL DISORDERS: ICD-10-CM

## 2019-05-23 DIAGNOSIS — Z78.9 OTHER SPECIFIED HEALTH STATUS: ICD-10-CM

## 2019-05-23 DIAGNOSIS — Z82.49 FAMILY HISTORY OF ISCHEMIC HEART DISEASE AND OTHER DISEASES OF THE CIRCULATORY SYSTEM: ICD-10-CM

## 2019-05-23 DIAGNOSIS — Z87.898 PERSONAL HISTORY OF OTHER SPECIFIED CONDITIONS: ICD-10-CM

## 2019-05-23 DIAGNOSIS — I45.5 OTHER SPECIFIED HEART BLOCK: ICD-10-CM

## 2019-05-23 DIAGNOSIS — Z82.61 FAMILY HISTORY OF ARTHRITIS: ICD-10-CM

## 2019-05-23 DIAGNOSIS — Z80.8 FAMILY HISTORY OF MALIGNANT NEOPLASM OF OTHER ORGANS OR SYSTEMS: ICD-10-CM

## 2019-05-23 DIAGNOSIS — I34.1 NONRHEUMATIC MITRAL (VALVE) PROLAPSE: ICD-10-CM

## 2019-05-23 DIAGNOSIS — Z82.3 FAMILY HISTORY OF STROKE: ICD-10-CM

## 2019-05-23 DIAGNOSIS — Z87.81 PERSONAL HISTORY OF (HEALED) TRAUMATIC FRACTURE: ICD-10-CM

## 2019-05-23 PROCEDURE — 99213 OFFICE O/P EST LOW 20 MIN: CPT

## 2019-05-23 PROCEDURE — 93000 ELECTROCARDIOGRAM COMPLETE: CPT

## 2019-05-23 NOTE — PHYSICAL EXAM
[General Appearance - Well Developed] : well developed [Normal Appearance] : normal appearance [Well Groomed] : well groomed [General Appearance - Well Nourished] : well nourished [No Deformities] : no deformities [General Appearance - In No Acute Distress] : no acute distress [Normal Conjunctiva] : the conjunctiva exhibited no abnormalities [Eyelids - No Xanthelasma] : the eyelids demonstrated no xanthelasmas [Normal Oral Mucosa] : normal oral mucosa [No Oral Pallor] : no oral pallor [No Oral Cyanosis] : no oral cyanosis [Normal Jugular Venous A Waves Present] : normal jugular venous A waves present [Normal Jugular Venous V Waves Present] : normal jugular venous V waves present [No Jugular Venous Lopez A Waves] : no jugular venous lopez A waves [Respiration, Rhythm And Depth] : normal respiratory rhythm and effort [Exaggerated Use Of Accessory Muscles For Inspiration] : no accessory muscle use [Auscultation Breath Sounds / Voice Sounds] : lungs were clear to auscultation bilaterally [Heart Rate And Rhythm] : heart rate and rhythm were normal [Heart Sounds] : normal S1 and S2 [Murmurs] : no murmurs present [Abdomen Soft] : soft [Abdomen Tenderness] : non-tender [Abdomen Mass (___ Cm)] : no abdominal mass palpated [Abnormal Walk] : normal gait [Gait - Sufficient For Exercise Testing] : the gait was sufficient for exercise testing [Nail Clubbing] : no clubbing of the fingernails [Cyanosis, Localized] : no localized cyanosis [Petechial Hemorrhages (___cm)] : no petechial hemorrhages [Skin Color & Pigmentation] : normal skin color and pigmentation [] : no rash [No Venous Stasis] : no venous stasis [Skin Lesions] : no skin lesions [No Skin Ulcers] : no skin ulcer [No Xanthoma] : no  xanthoma was observed [Oriented To Time, Place, And Person] : oriented to person, place, and time [Affect] : the affect was normal [Mood] : the mood was normal [No Anxiety] : not feeling anxious

## 2019-05-23 NOTE — HISTORY OF PRESENT ILLNESS
[FreeTextEntry1] : Intermittent chest pressure.  It is a constant sensation.  She has stopped running because she is scared, but it is not worse with activity.  Also fleeting seconds of lightheadedness.  No syncope or collapse.  No shortness of breath.  Generally weak.

## 2019-05-23 NOTE — DISCUSSION/SUMMARY
[FreeTextEntry1] : Monitor shows PAT and no offset pause; ie normal AV rowan function and sinus node recovery.  I suspect that her previously demonstrated bradycardia was related to vagal tone. Will hold off on PPM at this time.  \par \par Follow up with PMD. I will see "as needed".

## 2019-06-06 ENCOUNTER — APPOINTMENT (OUTPATIENT)
Dept: INTERNAL MEDICINE | Facility: CLINIC | Age: 54
End: 2019-06-06
Payer: COMMERCIAL

## 2019-06-06 VITALS
DIASTOLIC BLOOD PRESSURE: 68 MMHG | SYSTOLIC BLOOD PRESSURE: 110 MMHG | WEIGHT: 158 LBS | RESPIRATION RATE: 16 BRPM | HEART RATE: 61 BPM | HEIGHT: 64 IN | BODY MASS INDEX: 26.98 KG/M2

## 2019-06-06 DIAGNOSIS — Z78.9 OTHER SPECIFIED HEALTH STATUS: ICD-10-CM

## 2019-06-06 DIAGNOSIS — Z86.19 PERSONAL HISTORY OF OTHER INFECTIOUS AND PARASITIC DISEASES: ICD-10-CM

## 2019-06-06 DIAGNOSIS — R42 DIZZINESS AND GIDDINESS: ICD-10-CM

## 2019-06-06 DIAGNOSIS — R07.9 CHEST PAIN, UNSPECIFIED: ICD-10-CM

## 2019-06-06 PROCEDURE — 99203 OFFICE O/P NEW LOW 30 MIN: CPT

## 2019-06-06 NOTE — PHYSICAL EXAM
[Well Nourished] : well nourished [No Acute Distress] : no acute distress [Well-Appearing] : well-appearing [Well Developed] : well developed [Normal Sclera/Conjunctiva] : normal sclera/conjunctiva [EOMI] : extraocular movements intact [PERRL] : pupils equal round and reactive to light [Normal Outer Ear/Nose] : the outer ears and nose were normal in appearance [Normal Oropharynx] : the oropharynx was normal [No JVD] : no jugular venous distention [Supple] : supple [No Lymphadenopathy] : no lymphadenopathy [No Respiratory Distress] : no respiratory distress  [Thyroid Normal, No Nodules] : the thyroid was normal and there were no nodules present [Clear to Auscultation] : lungs were clear to auscultation bilaterally [Normal Rate] : normal rate  [No Accessory Muscle Use] : no accessory muscle use [Normal S1, S2] : normal S1 and S2 [Regular Rhythm] : with a regular rhythm [No Murmur] : no murmur heard [No Carotid Bruits] : no carotid bruits [No Abdominal Bruit] : a ~M bruit was not heard ~T in the abdomen [Pedal Pulses Present] : the pedal pulses are present [No Palpable Aorta] : no palpable aorta [No Edema] : there was no peripheral edema [Non Tender] : non-tender [Soft] : abdomen soft [Non-distended] : non-distended [No Masses] : no abdominal mass palpated [No HSM] : no HSM [Normal Bowel Sounds] : normal bowel sounds [Normal Posterior Cervical Nodes] : no posterior cervical lymphadenopathy [Normal Anterior Cervical Nodes] : no anterior cervical lymphadenopathy [No CVA Tenderness] : no CVA  tenderness [Grossly Normal Strength/Tone] : grossly normal strength/tone [No Spinal Tenderness] : no spinal tenderness [No Joint Swelling] : no joint swelling [Normal Gait] : normal gait [No Rash] : no rash [Coordination Grossly Intact] : coordination grossly intact [No Focal Deficits] : no focal deficits [Memory Grossly Normal] : memory grossly normal [Speech Grossly Normal] : speech grossly normal [Alert and Oriented x3] : oriented to person, place, and time [Normal Affect] : the affect was normal [Normal Insight/Judgement] : insight and judgment were intact [Normal Mood] : the mood was normal

## 2019-06-06 NOTE — HISTORY OF PRESENT ILLNESS
[FreeTextEntry1] : adhd, bradycardia, ar, aches [de-identified] : 53 y/o female with a hx of adhd, ar, lyme s/p rx, s/p admission 5/19 with dizziness and bradycardia.  Had cardiac w/u including cath. Has followed up with EPS -  bradycardia thought to be secondary to vagal tone.  Runs regularly.  \par Getting PT for MCL strain.\par Reports occ pains at the chest.  + occ shooting pains at the arms and leg.  no other noted focal muscle or joint sx.  no noted sx at the neck or back.  Chest pains reported as a squeezing across the chest.  Has been improving.  Shooting pain comes and goes on its own.  no noted triggers.  no noted relieving or exacerbating factors.  Reports that EPS wants her to see Rheum - has appt.\par \par Had labs including a1c, lipids, tsh in the hospital.  \par \par tet ~ 9 years ago\par flu - gets\par \par gyn - last mo

## 2019-06-06 NOTE — HEALTH RISK ASSESSMENT
[No falls in past year] : Patient reported no falls in the past year [0] : 2) Feeling down, depressed, or hopeless: Not at all (0) [Patient reported mammogram was normal] : Patient reported mammogram was normal [Patient reported PAP Smear was normal] : Patient reported PAP Smear was normal [Patient reported bone density results were normal] : Patient reported bone density results were normal [Patient reported colonoscopy was normal] : Patient reported colonoscopy was normal [None] : None [Retired] : retired [] :  [Single] : single [Fully functional (bathing, dressing, toileting, transferring, walking, feeding)] : Fully functional (bathing, dressing, toileting, transferring, walking, feeding) [Feels Safe at Home] : Feels safe at home [Fully functional (using the telephone, shopping, preparing meals, housekeeping, doing laundry, using] : Fully functional and needs no help or supervision to perform IADLs (using the telephone, shopping, preparing meals, housekeeping, doing laundry, using transportation, managing medications and managing finances) [Smoke Detector] : smoke detector [Carbon Monoxide Detector] : carbon monoxide detector [Sunscreen] : uses sunscreen [Seat Belt] :  uses seat belt [] : No [[id number]] : 0 [Reports changes in hearing] : Reports no changes in hearing [Change in mental status noted] : No change in mental status noted [Reports changes in vision] : Reports no changes in vision [Reports changes in dental health] : Reports no changes in dental health [MammogramDate] : 1/19 [PapSmearDate] : 5/19 [ColonoscopyDate] : 2014 [ColonoscopyComments] : Dr Rowe [BoneDensityDate] : 1/19 [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

## 2019-06-06 NOTE — REVIEW OF SYSTEMS
[Chest Pain] : chest pain [Dizziness] : dizziness [Negative] : Heme/Lymph [Headache] : no headache [Fainting] : no fainting [Confusion] : no confusion [Unsteady Walking] : no ataxia [Memory Loss] : no memory loss [FreeTextEntry9] : pains at the extremities [FreeTextEntry5] : bradycardia

## 2019-07-25 ENCOUNTER — APPOINTMENT (OUTPATIENT)
Dept: RHEUMATOLOGY | Facility: CLINIC | Age: 54
End: 2019-07-25

## 2020-02-11 ENCOUNTER — APPOINTMENT (OUTPATIENT)
Dept: INTERNAL MEDICINE | Facility: CLINIC | Age: 55
End: 2020-02-11
Payer: COMMERCIAL

## 2020-02-11 VITALS
WEIGHT: 158 LBS | RESPIRATION RATE: 16 BRPM | BODY MASS INDEX: 26.98 KG/M2 | HEART RATE: 56 BPM | DIASTOLIC BLOOD PRESSURE: 60 MMHG | HEIGHT: 64 IN | SYSTOLIC BLOOD PRESSURE: 98 MMHG

## 2020-02-11 DIAGNOSIS — Z23 ENCOUNTER FOR IMMUNIZATION: ICD-10-CM

## 2020-02-11 DIAGNOSIS — Z00.00 ENCOUNTER FOR GENERAL ADULT MEDICAL EXAMINATION W/OUT ABNORMAL FINDINGS: ICD-10-CM

## 2020-02-11 DIAGNOSIS — R00.1 BRADYCARDIA, UNSPECIFIED: ICD-10-CM

## 2020-02-11 PROCEDURE — 36415 COLL VENOUS BLD VENIPUNCTURE: CPT

## 2020-02-11 PROCEDURE — 90471 IMMUNIZATION ADMIN: CPT

## 2020-02-11 PROCEDURE — 90715 TDAP VACCINE 7 YRS/> IM: CPT

## 2020-02-11 PROCEDURE — 99396 PREV VISIT EST AGE 40-64: CPT | Mod: 25

## 2020-02-11 NOTE — REVIEW OF SYSTEMS
[Fatigue] : fatigue [Dyspnea on Exertion] : dyspnea on exertion [Negative] : Heme/Lymph [Fever] : no fever [Hot Flashes] : no hot flashes [Chills] : no chills [Night Sweats] : no night sweats [Recent Change In Weight] : ~T no recent weight change [Shortness Of Breath] : no shortness of breath [Wheezing] : no wheezing [Cough] : no cough [Joint Pain] : no joint pain [Joint Stiffness] : no joint stiffness [Joint Swelling] : no joint swelling [Back Pain] : no back pain [Muscle Weakness] : no muscle weakness [Muscle Pain] : no muscle pain [Headache] : no headache [Skin Rash] : no skin rash [Fainting] : no fainting [Confusion] : no confusion [Dizziness] : no dizziness [Memory Loss] : no memory loss [Unsteady Walking] : no ataxia [FreeTextEntry9] : leg pains [de-identified] : numbness

## 2020-02-11 NOTE — HISTORY OF PRESENT ILLNESS
[FreeTextEntry1] : Annual PE\par adhd, bradycardia, ar, aches [de-identified] : 53 y/o female with a hx of adhd, ar, lyme s/p rx, s/p admission 5/19 with dizziness and bradycardia.  Had cardiac w/u including cath. Has followed up with EPS -  bradycardia thought to be secondary to vagal tone.  Runs regularly.  \par did not see rheum.  \par CP has been better.\par Has been having continued fatigue feeling winded.  Paul when she is going for a run.  Has not been able to go > 1 mile w/o getting the sx.  did not f/u with the cardiologist for this. Plans on going there next.   no other CV sx.\par with shooting pains at the legs - located at the sides - variable location.  No noted triggers.  some cold sensation at the feet and some numbness at the toes - intermittent - takes hours to go away.  no le weakness.  no other le pain.  no back pains.  No swelling, bruising, erythema, discoloration.  \par s/p PT for the MCL - has been good.  \par Continues Adderall - has been changing to ER 10mg daily prn.  Not taking every day.\par \par tet ~ 10 years ago\par flu - gets\par \par gyn - follows\par colon - 2018

## 2020-02-11 NOTE — PHYSICAL EXAM
[No Acute Distress] : no acute distress [Well Nourished] : well nourished [Well Developed] : well developed [Well-Appearing] : well-appearing [Normal Sclera/Conjunctiva] : normal sclera/conjunctiva [EOMI] : extraocular movements intact [PERRL] : pupils equal round and reactive to light [Normal Outer Ear/Nose] : the outer ears and nose were normal in appearance [No JVD] : no jugular venous distention [Normal Oropharynx] : the oropharynx was normal [No Lymphadenopathy] : no lymphadenopathy [Supple] : supple [No Respiratory Distress] : no respiratory distress  [Thyroid Normal, No Nodules] : the thyroid was normal and there were no nodules present [No Accessory Muscle Use] : no accessory muscle use [Normal Rate] : normal rate  [Clear to Auscultation] : lungs were clear to auscultation bilaterally [Regular Rhythm] : with a regular rhythm [No Murmur] : no murmur heard [Normal S1, S2] : normal S1 and S2 [No Carotid Bruits] : no carotid bruits [No Abdominal Bruit] : a ~M bruit was not heard ~T in the abdomen [Pedal Pulses Present] : the pedal pulses are present [No Palpable Aorta] : no palpable aorta [No Edema] : there was no peripheral edema [Non Tender] : non-tender [Soft] : abdomen soft [No Masses] : no abdominal mass palpated [Non-distended] : non-distended [No HSM] : no HSM [Normal Posterior Cervical Nodes] : no posterior cervical lymphadenopathy [Normal Bowel Sounds] : normal bowel sounds [Normal Anterior Cervical Nodes] : no anterior cervical lymphadenopathy [No CVA Tenderness] : no CVA  tenderness [Grossly Normal Strength/Tone] : grossly normal strength/tone [No Joint Swelling] : no joint swelling [No Spinal Tenderness] : no spinal tenderness [Coordination Grossly Intact] : coordination grossly intact [No Rash] : no rash [Normal Gait] : normal gait [Speech Grossly Normal] : speech grossly normal [No Focal Deficits] : no focal deficits [Memory Grossly Normal] : memory grossly normal [Normal Affect] : the affect was normal [Alert and Oriented x3] : oriented to person, place, and time [Normal Mood] : the mood was normal [Normal Insight/Judgement] : insight and judgment were intact [de-identified] : good cap refill at both feet.

## 2020-02-12 LAB
25(OH)D3 SERPL-MCNC: 30.3 NG/ML
ALBUMIN SERPL ELPH-MCNC: 5.1 G/DL
ALP BLD-CCNC: 63 U/L
ALT SERPL-CCNC: 25 U/L
ANION GAP SERPL CALC-SCNC: 17 MMOL/L
AST SERPL-CCNC: 18 U/L
BASOPHILS # BLD AUTO: 0.05 K/UL
BASOPHILS NFR BLD AUTO: 0.6 %
BILIRUB SERPL-MCNC: 0.2 MG/DL
BUN SERPL-MCNC: 18 MG/DL
CALCIUM SERPL-MCNC: 10.3 MG/DL
CHLORIDE SERPL-SCNC: 103 MMOL/L
CHOLEST SERPL-MCNC: 202 MG/DL
CHOLEST/HDLC SERPL: 3.7 RATIO
CO2 SERPL-SCNC: 25 MMOL/L
CREAT SERPL-MCNC: 0.92 MG/DL
CRP SERPL-MCNC: <0.1 MG/DL
EOSINOPHIL # BLD AUTO: 0.08 K/UL
EOSINOPHIL NFR BLD AUTO: 1 %
ERYTHROCYTE [SEDIMENTATION RATE] IN BLOOD BY WESTERGREN METHOD: 14 MM/HR
ESTIMATED AVERAGE GLUCOSE: 105 MG/DL
FOLATE SERPL-MCNC: 13.4 NG/ML
GLUCOSE SERPL-MCNC: 68 MG/DL
HBA1C MFR BLD HPLC: 5.3 %
HCT VFR BLD CALC: 43 %
HDLC SERPL-MCNC: 54 MG/DL
HGB BLD-MCNC: 14.5 G/DL
IMM GRANULOCYTES NFR BLD AUTO: 0.2 %
LDLC SERPL CALC-MCNC: 102 MG/DL
LYMPHOCYTES # BLD AUTO: 2.84 K/UL
LYMPHOCYTES NFR BLD AUTO: 34.5 %
MAN DIFF?: NORMAL
MCHC RBC-ENTMCNC: 31 PG
MCHC RBC-ENTMCNC: 33.7 GM/DL
MCV RBC AUTO: 92.1 FL
MONOCYTES # BLD AUTO: 0.5 K/UL
MONOCYTES NFR BLD AUTO: 6.1 %
NEUTROPHILS # BLD AUTO: 4.74 K/UL
NEUTROPHILS NFR BLD AUTO: 57.6 %
PLATELET # BLD AUTO: 249 K/UL
POTASSIUM SERPL-SCNC: 4.3 MMOL/L
PROT SERPL-MCNC: 7.2 G/DL
RBC # BLD: 4.67 M/UL
RBC # FLD: 12.8 %
SODIUM SERPL-SCNC: 145 MMOL/L
T4 FREE SERPL-MCNC: 1.2 NG/DL
TRIGL SERPL-MCNC: 231 MG/DL
TSH SERPL-ACNC: 2.95 UIU/ML
VIT B12 SERPL-MCNC: 525 PG/ML
WBC # FLD AUTO: 8.23 K/UL

## 2020-02-13 LAB — ANA SER IF-ACNC: NEGATIVE

## 2020-05-20 ENCOUNTER — APPOINTMENT (OUTPATIENT)
Dept: RHEUMATOLOGY | Facility: CLINIC | Age: 55
End: 2020-05-20
Payer: COMMERCIAL

## 2020-05-20 DIAGNOSIS — Z82.69 FAMILY HISTORY OF OTHER DISEASES OF THE MUSCULOSKELETAL SYSTEM AND CONNECTIVE TISSUE: ICD-10-CM

## 2020-05-20 PROCEDURE — 99205 OFFICE O/P NEW HI 60 MIN: CPT | Mod: 95

## 2020-05-20 NOTE — CONSULT LETTER
[Dear  ___] : Dear  [unfilled], [Please see my note below.] : Please see my note below. [Consult Letter:] : I had the pleasure of evaluating your patient, [unfilled]. [Sincerely,] : Sincerely, [Consult Closing:] : Thank you very much for allowing me to participate in the care of this patient.  If you have any questions, please do not hesitate to contact me. [FreeTextEntry3] : Kevin Hernandez MD\par Rheumatology\par [facility]\par  of Medicine\par [employer] [facility] \par \par [address]\[place] [address]\par \par [address]\[place] [address]\par \par [address] [place] [address]\par \par phone:  [phone]\par fax:      [phone]

## 2020-05-20 NOTE — ASSESSMENT
[FreeTextEntry1] : 54 year old female presents with recent pain in her B/L upper and lower extremities (usually 1 at a time, worst in the LUE), which has now resolved since she stopped running. As her symptoms have already resolved, the etiology is unclear.  However, since her pain affected entire extremities at a time, a neurologic etiology is possible.  She does not exhibit any obvious signs/symptoms of an underlying connective tissue disorder, other than dry eyes which can occur in Sjogren's Syndrome, but is very non-specific.  I have therefore ordered some more bloodwork and x-rays as further workup. \par Pt also c/o intermittent pain in her hands and feet, most suggestive of OA.  I have therefore ordered some x-rays of her hands and feet to help confirm her diagnosis\par  She will follow up with me again in 1 month  to review her results.\par \par

## 2020-05-20 NOTE — PHYSICAL EXAM
[General Appearance - Alert] : alert [General Appearance - In No Acute Distress] : in no acute distress [Sclera] : the sclera and conjunctiva were normal [Skin Color & Pigmentation] : normal skin color and pigmentation [Skin Turgor] : normal skin turgor [] : no rash [Oriented To Time, Place, And Person] : oriented to person, place, and time [Impaired Insight] : insight and judgment were intact [Affect] : the affect was normal [FreeTextEntry1] : No visible synovitis;  full ROM in all joints

## 2020-05-20 NOTE — HISTORY OF PRESENT ILLNESS
[Home] : at home, [unfilled] , at the time of the visit. [Medical Office: ([facility])___] : at the medical office located in  [Verbal consent obtained from patient] : the patient, [unfilled] [Dry Eyes] : dry eyes [FreeTextEntry1] : 54 year old female with PMHx as listed below reports that she about 1 year ago (April 2019), she began to experience pain in her extremities, after a prolonged CCU admission (for bradycardia).   The pain was intermittent - typically occurring in one limb at a time, lasting for 2-3 days.  It occurred most commonly in the LUE.  The pain would be throughout the affected extremity.  She describes the pain as sharp and like electricity.  She denies any swelling, erythema or warmth.  No AM stiffness.  Of note, pt reports that she had been running about 5 miles three times a week.  About 3 months ago, she stopped running, and the pain has now resolved.  No current complaints.\par Of note, pt reports a history of "arthritis" in her hands and feet s/p prior injuries, though not experiencing any pain at this time.\par No F/C, no unintentional weight loss, no night sweats, no oral ulcers, no rashes, no alopecia, no photosensitivity, (+) dry eyes, no dry mouth, no Raynaud symptoms, no focal weakness, no dysphagia  [Anorexia] : no anorexia [Weight Loss] : no weight loss [Malaise] : no malaise [Fever] : no fever [Fatigue] : no fatigue [Chills] : no chills [Depression] : no depression [Malar Facial Rash] : no malar facial rash [Skin Lesions] : no lesions [Skin Nodules] : no skin nodules [Oral Ulcers] : no oral ulcers [Cough] : no cough [Dysphonia] : no dysphonia [Dry Mouth] : no dry mouth [Shortness of Breath] : no shortness of breath [Dysphagia] : no dysphagia [Chest Pain] : no chest pain [Arthralgias] : no arthralgias [Joint Swelling] : no joint swelling [Joint Warmth] : no joint warmth [Morning Stiffness] : no morning stiffness [Joint Deformity] : no joint deformity [Decreased ROM] : no decreased range of motion [Falls] : no falls [Difficulty Standing] : no difficulty standing [Difficulty Walking] : no difficulty walking [Dyspnea] : no dyspnea [Muscle Weakness] : no muscle weakness [Muscle Spasms] : no muscle spasms [Myalgias] : no myalgias [Visual Changes] : no visual changes [Muscle Cramping] : no muscle cramping [Eye Redness] : no eye redness [Eye Pain] : no eye pain

## 2020-05-26 ENCOUNTER — TRANSCRIPTION ENCOUNTER (OUTPATIENT)
Age: 55
End: 2020-05-26

## 2020-06-07 ENCOUNTER — NON-APPOINTMENT (OUTPATIENT)
Age: 55
End: 2020-06-07

## 2020-06-07 DIAGNOSIS — R39.9 UNSPECIFIED SYMPTOMS AND SIGNS INVOLVING THE GENITOURINARY SYSTEM: ICD-10-CM

## 2020-06-08 LAB
BASOPHILS # BLD AUTO: 0.04 K/UL
BASOPHILS NFR BLD AUTO: 0.8 %
EOSINOPHIL # BLD AUTO: 0.05 K/UL
EOSINOPHIL NFR BLD AUTO: 1 %
ERYTHROCYTE [SEDIMENTATION RATE] IN BLOOD BY WESTERGREN METHOD: 24 MM/HR
HCT VFR BLD CALC: 40.6 %
HGB BLD-MCNC: 13.6 G/DL
IMM GRANULOCYTES NFR BLD AUTO: 0 %
LYMPHOCYTES # BLD AUTO: 1.6 K/UL
LYMPHOCYTES NFR BLD AUTO: 32.4 %
MAN DIFF?: NORMAL
MCHC RBC-ENTMCNC: 30.2 PG
MCHC RBC-ENTMCNC: 33.5 GM/DL
MCV RBC AUTO: 90.2 FL
MONOCYTES # BLD AUTO: 0.47 K/UL
MONOCYTES NFR BLD AUTO: 9.5 %
NEUTROPHILS # BLD AUTO: 2.78 K/UL
NEUTROPHILS NFR BLD AUTO: 56.3 %
PLATELET # BLD AUTO: 226 K/UL
RBC # BLD: 4.5 M/UL
RBC # FLD: 13 %
WBC # FLD AUTO: 4.94 K/UL

## 2020-06-09 ENCOUNTER — APPOINTMENT (OUTPATIENT)
Dept: RADIOLOGY | Facility: CLINIC | Age: 55
End: 2020-06-09
Payer: COMMERCIAL

## 2020-06-09 ENCOUNTER — OUTPATIENT (OUTPATIENT)
Dept: OUTPATIENT SERVICES | Facility: HOSPITAL | Age: 55
LOS: 1 days | End: 2020-06-09
Payer: COMMERCIAL

## 2020-06-09 DIAGNOSIS — Z90.89 ACQUIRED ABSENCE OF OTHER ORGANS: Chronic | ICD-10-CM

## 2020-06-09 DIAGNOSIS — Z98.51 TUBAL LIGATION STATUS: Chronic | ICD-10-CM

## 2020-06-09 DIAGNOSIS — M13.0 POLYARTHRITIS, UNSPECIFIED: ICD-10-CM

## 2020-06-09 LAB
ALBUMIN SERPL ELPH-MCNC: 4.7 G/DL
ALP BLD-CCNC: 55 U/L
ALT SERPL-CCNC: 17 U/L
ANION GAP SERPL CALC-SCNC: 11 MMOL/L
AST SERPL-CCNC: 16 U/L
BILIRUB SERPL-MCNC: 0.3 MG/DL
BUN SERPL-MCNC: 13 MG/DL
CALCIUM SERPL-MCNC: 9.8 MG/DL
CHLORIDE SERPL-SCNC: 103 MMOL/L
CO2 SERPL-SCNC: 26 MMOL/L
CREAT SERPL-MCNC: 0.84 MG/DL
CRP SERPL-MCNC: 0.12 MG/DL
ENA SS-A AB SER IA-ACNC: <0.2 AL
ENA SS-B AB SER IA-ACNC: <0.2 AL
GLUCOSE SERPL-MCNC: 96 MG/DL
POTASSIUM SERPL-SCNC: 4.3 MMOL/L
PROT SERPL-MCNC: 6.6 G/DL
RHEUMATOID FACT SER QL: <10 IU/ML
SODIUM SERPL-SCNC: 141 MMOL/L

## 2020-06-09 PROCEDURE — 73630 X-RAY EXAM OF FOOT: CPT | Mod: 26,LT

## 2020-06-09 PROCEDURE — 73130 X-RAY EXAM OF HAND: CPT | Mod: 26,RT,76

## 2020-06-09 PROCEDURE — 72100 X-RAY EXAM L-S SPINE 2/3 VWS: CPT | Mod: 26

## 2020-06-09 PROCEDURE — 72040 X-RAY EXAM NECK SPINE 2-3 VW: CPT | Mod: 26

## 2020-06-09 PROCEDURE — 73630 X-RAY EXAM OF FOOT: CPT

## 2020-06-09 PROCEDURE — 73630 X-RAY EXAM OF FOOT: CPT | Mod: 26,RT,76

## 2020-06-09 PROCEDURE — 72040 X-RAY EXAM NECK SPINE 2-3 VW: CPT

## 2020-06-09 PROCEDURE — 73130 X-RAY EXAM OF HAND: CPT

## 2020-06-09 PROCEDURE — 73130 X-RAY EXAM OF HAND: CPT | Mod: 26,LT

## 2020-06-09 PROCEDURE — 72100 X-RAY EXAM L-S SPINE 2/3 VWS: CPT

## 2020-06-10 ENCOUNTER — APPOINTMENT (OUTPATIENT)
Dept: RHEUMATOLOGY | Facility: CLINIC | Age: 55
End: 2020-06-10
Payer: COMMERCIAL

## 2020-06-10 DIAGNOSIS — M50.30 OTHER CERVICAL DISC DEGENERATION, UNSPECIFIED CERVICAL REGION: ICD-10-CM

## 2020-06-10 DIAGNOSIS — M79.604 PAIN IN RIGHT LEG: ICD-10-CM

## 2020-06-10 DIAGNOSIS — M79.601 PAIN IN RIGHT ARM: ICD-10-CM

## 2020-06-10 DIAGNOSIS — M79.602 PAIN IN RIGHT ARM: ICD-10-CM

## 2020-06-10 DIAGNOSIS — M47.812 SPONDYLOSIS W/OUT MYELOPATHY OR RADICULOPATHY, CERVICAL REGION: ICD-10-CM

## 2020-06-10 DIAGNOSIS — M79.605 PAIN IN RIGHT LEG: ICD-10-CM

## 2020-06-10 DIAGNOSIS — H04.123 DRY EYE SYNDROME OF BILATERAL LACRIMAL GLANDS: ICD-10-CM

## 2020-06-10 PROCEDURE — 99214 OFFICE O/P EST MOD 30 MIN: CPT | Mod: 95

## 2020-06-10 NOTE — HISTORY OF PRESENT ILLNESS
[Home] : at home, [unfilled] , at the time of the visit. [Medical Office: ([facility])___] : at the medical office located in  [Verbal consent obtained from patient] : the patient, [unfilled] [Dry Eyes] : dry eyes [FreeTextEntry1] : Feeling fine overall since last visit.  No recurrence of the pain she had been experiencing in her extremities.  Still w/ intermittent pain in her neck and lower back, unchanged.   No recurrence of bradycardia.  No new complaints. [Anorexia] : no anorexia [Weight Loss] : no weight loss [Malaise] : no malaise [Fever] : no fever [Chills] : no chills [Fatigue] : no fatigue [Depression] : no depression [Malar Facial Rash] : no malar facial rash [Skin Lesions] : no lesions [Skin Nodules] : no skin nodules [Oral Ulcers] : no oral ulcers [Cough] : no cough [Dry Mouth] : no dry mouth [Dysphonia] : no dysphonia [Dysphagia] : no dysphagia [Shortness of Breath] : no shortness of breath [Chest Pain] : no chest pain [Arthralgias] : no arthralgias [Joint Swelling] : no joint swelling [Joint Warmth] : no joint warmth [Joint Deformity] : no joint deformity [Decreased ROM] : no decreased range of motion [Morning Stiffness] : no morning stiffness [Falls] : no falls [Difficulty Standing] : no difficulty standing [Difficulty Walking] : no difficulty walking [Dyspnea] : no dyspnea [Myalgias] : no myalgias [Muscle Weakness] : no muscle weakness [Muscle Spasms] : no muscle spasms [Muscle Cramping] : no muscle cramping [Visual Changes] : no visual changes [Eye Pain] : no eye pain [Eye Redness] : no eye redness

## 2020-06-10 NOTE — DATA REVIEWED
[FreeTextEntry1] : x-rays of hands:  old fracture in distal left radius; otherwise unremarkable\par x-rays of feet:  unremarkable\par x-rays of c-spine:  OA/DDD\par x-rays of L-spine: DDD

## 2020-06-10 NOTE — ASSESSMENT
[FreeTextEntry1] : 54 year old female with:\par 1)  Intermittent pain in B/L upper and lower extremities (usually 1 at a time, worst in the LUE):  Currently resolved.  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

## 2020-06-12 LAB
CCP AB SER IA-ACNC: <8 UNITS
RF+CCP IGG SER-IMP: NEGATIVE

## 2020-06-18 ENCOUNTER — APPOINTMENT (OUTPATIENT)
Dept: MAMMOGRAPHY | Facility: CLINIC | Age: 55
End: 2020-06-18
Payer: COMMERCIAL

## 2020-06-18 ENCOUNTER — OUTPATIENT (OUTPATIENT)
Dept: OUTPATIENT SERVICES | Facility: HOSPITAL | Age: 55
LOS: 1 days | End: 2020-06-18
Payer: COMMERCIAL

## 2020-06-18 ENCOUNTER — APPOINTMENT (OUTPATIENT)
Dept: RADIOLOGY | Facility: CLINIC | Age: 55
End: 2020-06-18
Payer: COMMERCIAL

## 2020-06-18 DIAGNOSIS — Z98.51 TUBAL LIGATION STATUS: Chronic | ICD-10-CM

## 2020-06-18 DIAGNOSIS — Z00.8 ENCOUNTER FOR OTHER GENERAL EXAMINATION: ICD-10-CM

## 2020-06-18 DIAGNOSIS — Z90.89 ACQUIRED ABSENCE OF OTHER ORGANS: Chronic | ICD-10-CM

## 2020-06-18 PROCEDURE — 77080 DXA BONE DENSITY AXIAL: CPT | Mod: 26

## 2020-06-18 PROCEDURE — 77063 BREAST TOMOSYNTHESIS BI: CPT | Mod: 26

## 2020-06-18 PROCEDURE — 77067 SCR MAMMO BI INCL CAD: CPT | Mod: 26

## 2020-06-18 PROCEDURE — 77063 BREAST TOMOSYNTHESIS BI: CPT

## 2020-06-18 PROCEDURE — 77080 DXA BONE DENSITY AXIAL: CPT

## 2020-06-18 PROCEDURE — 77067 SCR MAMMO BI INCL CAD: CPT

## 2020-06-30 ENCOUNTER — APPOINTMENT (OUTPATIENT)
Dept: MAMMOGRAPHY | Facility: CLINIC | Age: 55
End: 2020-06-30

## 2020-06-30 ENCOUNTER — APPOINTMENT (OUTPATIENT)
Dept: ULTRASOUND IMAGING | Facility: CLINIC | Age: 55
End: 2020-06-30

## 2020-07-08 ENCOUNTER — APPOINTMENT (OUTPATIENT)
Dept: MAMMOGRAPHY | Facility: CLINIC | Age: 55
End: 2020-07-08
Payer: COMMERCIAL

## 2020-07-08 ENCOUNTER — APPOINTMENT (OUTPATIENT)
Dept: ULTRASOUND IMAGING | Facility: CLINIC | Age: 55
End: 2020-07-08
Payer: COMMERCIAL

## 2020-07-08 ENCOUNTER — OUTPATIENT (OUTPATIENT)
Dept: OUTPATIENT SERVICES | Facility: HOSPITAL | Age: 55
LOS: 1 days | End: 2020-07-08
Payer: COMMERCIAL

## 2020-07-08 DIAGNOSIS — Z98.51 TUBAL LIGATION STATUS: Chronic | ICD-10-CM

## 2020-07-08 DIAGNOSIS — Z90.89 ACQUIRED ABSENCE OF OTHER ORGANS: Chronic | ICD-10-CM

## 2020-07-08 DIAGNOSIS — Z00.8 ENCOUNTER FOR OTHER GENERAL EXAMINATION: ICD-10-CM

## 2020-07-08 PROCEDURE — 77065 DX MAMMO INCL CAD UNI: CPT

## 2020-07-08 PROCEDURE — G0279: CPT

## 2020-07-08 PROCEDURE — 76642 ULTRASOUND BREAST LIMITED: CPT

## 2020-07-08 PROCEDURE — 77065 DX MAMMO INCL CAD UNI: CPT | Mod: 26,LT

## 2020-07-08 PROCEDURE — G0279: CPT | Mod: 26

## 2020-07-08 PROCEDURE — 76642 ULTRASOUND BREAST LIMITED: CPT | Mod: 26,LT

## 2020-08-20 ENCOUNTER — APPOINTMENT (OUTPATIENT)
Dept: OTOLARYNGOLOGY | Facility: CLINIC | Age: 55
End: 2020-08-20
Payer: COMMERCIAL

## 2020-08-20 VITALS
SYSTOLIC BLOOD PRESSURE: 105 MMHG | DIASTOLIC BLOOD PRESSURE: 68 MMHG | RESPIRATION RATE: 18 BRPM | BODY MASS INDEX: 27.31 KG/M2 | HEART RATE: 59 BPM | HEIGHT: 64 IN | TEMPERATURE: 98 F | WEIGHT: 160 LBS

## 2020-08-20 DIAGNOSIS — J33.9 NASAL POLYP, UNSPECIFIED: ICD-10-CM

## 2020-08-20 DIAGNOSIS — R49.0 DYSPHONIA: ICD-10-CM

## 2020-08-20 DIAGNOSIS — D14.0 BENIGN NEOPLASM OF MIDDLE EAR, NASAL CAVITY AND ACCESSORY SINUSES: ICD-10-CM

## 2020-08-20 PROCEDURE — 31575 DIAGNOSTIC LARYNGOSCOPY: CPT

## 2020-08-20 PROCEDURE — 99203 OFFICE O/P NEW LOW 30 MIN: CPT | Mod: 25

## 2020-08-20 NOTE — PROCEDURE
[de-identified] : Fiberoptic Laryngoscopy ([id number])\par \par Procedure performed: Fiberoptic Laryngeal Endoscopy - Diagnostic\par Pre-op/post op indication: History of vocal cord polyps in the past\par Patient was unable to cooperate with mirror. After informed verbal consent is obtained, the fiberoptic nasal endoscope # []  is passed via the  left nasal cavity. \par Findings: base of tongue and vallecula clear, hypopharynx normal without pooled secretions, crisp epiglottis, no evidence of laryngomalacia, bilateral vocal fold mobility full and symmetric. There was  mild arytenoids edema and  erythema seen , without  mass or lesions.  No evidence of vocal cord polyp\par

## 2020-08-20 NOTE — HISTORY OF PRESENT ILLNESS
[de-identified] : 55 year old female presents with possible right nasal polyp in the front of nare noted x 6 months ago. . No nasal bleeding or foul odor noted. Pt quit smoking cigs 4 years, smoked x 30 years. pt states has hx  vocal cord polyps dx at least 4 years ago, c/o intermittent hoarseness persists denies odynophagia or dysphagia.

## 2020-08-20 NOTE — REASON FOR VISIT
[Initial Evaluation] : an initial evaluation for [Other: _____] : [unfilled] [FreeTextEntry2] : Possible right nasal polyp

## 2020-09-16 ENCOUNTER — APPOINTMENT (OUTPATIENT)
Dept: RHEUMATOLOGY | Facility: CLINIC | Age: 55
End: 2020-09-16

## 2021-07-16 ENCOUNTER — NON-APPOINTMENT (OUTPATIENT)
Age: 56
End: 2021-07-16

## 2021-07-21 ENCOUNTER — NON-APPOINTMENT (OUTPATIENT)
Age: 56
End: 2021-07-21

## 2021-07-21 ENCOUNTER — APPOINTMENT (OUTPATIENT)
Dept: INTERNAL MEDICINE | Facility: CLINIC | Age: 56
End: 2021-07-21
Payer: COMMERCIAL

## 2021-07-21 VITALS
BODY MASS INDEX: 28.68 KG/M2 | WEIGHT: 168 LBS | DIASTOLIC BLOOD PRESSURE: 70 MMHG | SYSTOLIC BLOOD PRESSURE: 110 MMHG | RESPIRATION RATE: 18 BRPM | HEART RATE: 59 BPM | OXYGEN SATURATION: 98 % | HEIGHT: 64 IN

## 2021-07-21 PROCEDURE — 99072 ADDL SUPL MATRL&STAF TM PHE: CPT

## 2021-07-21 PROCEDURE — 99214 OFFICE O/P EST MOD 30 MIN: CPT | Mod: 25

## 2021-07-21 PROCEDURE — 93000 ELECTROCARDIOGRAM COMPLETE: CPT

## 2021-07-21 PROCEDURE — 36415 COLL VENOUS BLD VENIPUNCTURE: CPT

## 2021-07-21 RX ORDER — NITROFURANTOIN (MONOHYDRATE/MACROCRYSTALS) 25; 75 MG/1; MG/1
100 CAPSULE ORAL TWICE DAILY
Qty: 14 | Refills: 0 | Status: DISCONTINUED | COMMUNITY
Start: 2020-06-07 | End: 2021-07-21

## 2021-07-21 RX ORDER — AZELASTINE HYDROCHLORIDE 137 UG/1
SPRAY, METERED NASAL
Refills: 0 | Status: DISCONTINUED | COMMUNITY
End: 2021-07-21

## 2021-07-21 NOTE — HEALTH RISK ASSESSMENT
[Yes] : Yes [2 - 4 times a month (2 pts)] : 2-4 times a month (2 points) [1 or 2 (0 pts)] : 1 or 2 (0 points) [Never (0 pts)] : Never (0 points) [No] : In the past 12 months have you used drugs other than those required for medical reasons? No [0] : 2) Feeling down, depressed, or hopeless: Not at all (0) [PHQ-2 Negative - No further assessment needed] : PHQ-2 Negative - No further assessment needed [] : No [Audit-CScore] : 2 [[id number]] : 0

## 2021-07-21 NOTE — HISTORY OF PRESENT ILLNESS
[FreeTextEntry1] : adhd, bradycardia, ar, aches [de-identified] : 55 y/o female with a hx of adhd, ar, lyme s/p rx, s/p admission 5/19 with dizziness and bradycardia. Had cardiac w/u including cath. Had followed up with EPS - bradycardia thought to be secondary to vagal tone. Runs regularly. \par did not see rheum. \par Has been having continued fatigue feeling winded. Paul when she is going for a run or biking. Had been having pains at the mid back/ribs.  Went to urgent care 2 weeks ago and was given mobic for poss muscle strain.  no noted chest pains, palpitations.  no dizziness. + some orthopnea-  dyspnea has woken her up.   \par no coughing or wheezing.  \par + fatigue.  Sx have been gradually increasing for the past few mo.  \par No noted numbness or weakness. \par Leg pains have been better \par s/p PT for the MCL - has been good.  reports that the knees sometimes give out.  \par Adderall has been on hold - waiting to find MD in [place].\par \par tet ~ 10 years ago\par flu - gets\par \par gyn - follows\par mammo - \par colon - 2018

## 2021-07-21 NOTE — PHYSICAL EXAM
[Well Nourished] : well nourished [Well Developed] : well developed [Well-Appearing] : well-appearing [Normal Sclera/Conjunctiva] : normal sclera/conjunctiva [PERRL] : pupils equal round and reactive to light [EOMI] : extraocular movements intact [Normal Outer Ear/Nose] : the outer ears and nose were normal in appearance [Normal Oropharynx] : the oropharynx was normal [No JVD] : no jugular venous distention [No Lymphadenopathy] : no lymphadenopathy [Supple] : supple [Thyroid Normal, No Nodules] : the thyroid was normal and there were no nodules present [No Respiratory Distress] : no respiratory distress  [No Accessory Muscle Use] : no accessory muscle use [Clear to Auscultation] : lungs were clear to auscultation bilaterally [Normal Rate] : normal rate  [Regular Rhythm] : with a regular rhythm [Normal S1, S2] : normal S1 and S2 [No Murmur] : no murmur heard [No Carotid Bruits] : no carotid bruits [No Abdominal Bruit] : a ~M bruit was not heard ~T in the abdomen [Pedal Pulses Present] : the pedal pulses are present [No Edema] : there was no peripheral edema [No Palpable Aorta] : no palpable aorta [Soft] : abdomen soft [Non Tender] : non-tender [Non-distended] : non-distended [No Masses] : no abdominal mass palpated [No HSM] : no HSM [Normal Bowel Sounds] : normal bowel sounds [Normal Posterior Cervical Nodes] : no posterior cervical lymphadenopathy [Normal Anterior Cervical Nodes] : no anterior cervical lymphadenopathy [No CVA Tenderness] : no CVA  tenderness [No Spinal Tenderness] : no spinal tenderness [No Joint Swelling] : no joint swelling [Grossly Normal Strength/Tone] : grossly normal strength/tone [Coordination Grossly Intact] : coordination grossly intact [No Focal Deficits] : no focal deficits [Normal Gait] : normal gait [Speech Grossly Normal] : speech grossly normal [Memory Grossly Normal] : memory grossly normal [Normal Affect] : the affect was normal [Alert and Oriented x3] : oriented to person, place, and time [Normal Mood] : the mood was normal [Normal Insight/Judgement] : insight and judgment were intact [No Acute Distress] : no acute distress [Normal Voice/Communication] : normal voice/communication [de-identified] : no back/rib tenderness.

## 2021-07-21 NOTE — REVIEW OF SYSTEMS
[Fatigue] : fatigue [Dyspnea on Exertion] : dyspnea on exertion [Negative] : Heme/Lymph [Fever] : no fever [Chills] : no chills [Hot Flashes] : no hot flashes [Night Sweats] : no night sweats [Recent Change In Weight] : ~T no recent weight change [Shortness Of Breath] : no shortness of breath [Wheezing] : no wheezing [Cough] : no cough [Joint Pain] : no joint pain [Joint Stiffness] : no joint stiffness [Joint Swelling] : no joint swelling [Muscle Weakness] : no muscle weakness [Muscle Pain] : no muscle pain [Back Pain] : no back pain [Skin Rash] : no skin rash [Headache] : no headache [Dizziness] : no dizziness [Fainting] : no fainting [Confusion] : no confusion [Memory Loss] : no memory loss [Unsteady Walking] : no ataxia [FreeTextEntry9] : leg pains [de-identified] : numbness

## 2021-07-22 LAB
ALBUMIN SERPL ELPH-MCNC: 4.9 G/DL
ALP BLD-CCNC: 63 U/L
ALT SERPL-CCNC: 22 U/L
ANION GAP SERPL CALC-SCNC: 12 MMOL/L
AST SERPL-CCNC: 22 U/L
BASOPHILS # BLD AUTO: 0.04 K/UL
BASOPHILS NFR BLD AUTO: 0.8 %
BILIRUB SERPL-MCNC: 0.7 MG/DL
BUN SERPL-MCNC: 11 MG/DL
CALCIUM SERPL-MCNC: 10.2 MG/DL
CHLORIDE SERPL-SCNC: 102 MMOL/L
CO2 SERPL-SCNC: 25 MMOL/L
CREAT SERPL-MCNC: 0.83 MG/DL
EOSINOPHIL # BLD AUTO: 0.03 K/UL
EOSINOPHIL NFR BLD AUTO: 0.6 %
GLUCOSE SERPL-MCNC: 87 MG/DL
HCT VFR BLD CALC: 45 %
HGB BLD-MCNC: 14.3 G/DL
IMM GRANULOCYTES NFR BLD AUTO: 0.2 %
LYMPHOCYTES # BLD AUTO: 1.95 K/UL
LYMPHOCYTES NFR BLD AUTO: 39.7 %
MAN DIFF?: NORMAL
MCHC RBC-ENTMCNC: 30.8 PG
MCHC RBC-ENTMCNC: 31.8 GM/DL
MCV RBC AUTO: 97 FL
MONOCYTES # BLD AUTO: 0.42 K/UL
MONOCYTES NFR BLD AUTO: 8.6 %
NEUTROPHILS # BLD AUTO: 2.46 K/UL
NEUTROPHILS NFR BLD AUTO: 50.1 %
PLATELET # BLD AUTO: 238 K/UL
POTASSIUM SERPL-SCNC: 4.7 MMOL/L
PROT SERPL-MCNC: 7.4 G/DL
RBC # BLD: 4.64 M/UL
RBC # FLD: 14.1 %
SODIUM SERPL-SCNC: 139 MMOL/L
TSH SERPL-ACNC: 1.43 UIU/ML
WBC # FLD AUTO: 4.91 K/UL

## 2021-08-18 ENCOUNTER — APPOINTMENT (OUTPATIENT)
Dept: INTERNAL MEDICINE | Facility: CLINIC | Age: 56
End: 2021-08-18
Payer: COMMERCIAL

## 2021-08-18 VITALS
BODY MASS INDEX: 28.68 KG/M2 | OXYGEN SATURATION: 98 % | DIASTOLIC BLOOD PRESSURE: 70 MMHG | WEIGHT: 168 LBS | HEART RATE: 55 BPM | SYSTOLIC BLOOD PRESSURE: 114 MMHG | HEIGHT: 64 IN | RESPIRATION RATE: 16 BRPM

## 2021-08-18 DIAGNOSIS — R06.00 DYSPNEA, UNSPECIFIED: ICD-10-CM

## 2021-08-18 DIAGNOSIS — R20.0 ANESTHESIA OF SKIN: ICD-10-CM

## 2021-08-18 DIAGNOSIS — F98.8 OTHER SPECIFIED BEHAVIORAL AND EMOTIONAL DISORDERS WITH ONSET USUALLY OCCURRING IN CHILDHOOD AND ADOLESCENCE: ICD-10-CM

## 2021-08-18 DIAGNOSIS — M13.0 POLYARTHRITIS, UNSPECIFIED: ICD-10-CM

## 2021-08-18 DIAGNOSIS — M51.36 OTHER INTERVERTEBRAL DISC DEGENERATION, LUMBAR REGION: ICD-10-CM

## 2021-08-18 DIAGNOSIS — R53.83 OTHER FATIGUE: ICD-10-CM

## 2021-08-18 DIAGNOSIS — I47.1 SUPRAVENTRICULAR TACHYCARDIA: ICD-10-CM

## 2021-08-18 DIAGNOSIS — M79.609 PAIN IN UNSPECIFIED LIMB: ICD-10-CM

## 2021-08-18 DIAGNOSIS — Z87.891 PERSONAL HISTORY OF NICOTINE DEPENDENCE: ICD-10-CM

## 2021-08-18 PROCEDURE — 99214 OFFICE O/P EST MOD 30 MIN: CPT

## 2021-08-18 RX ORDER — DEXTROAMPHETAMINE SACCHARATE, AMPHETAMINE ASPARTATE, DEXTROAMPHETAMINE SULFATE AND AMPHETAMINE SULFATE 1.25; 1.25; 1.25; 1.25 MG/1; MG/1; MG/1; MG/1
5 TABLET ORAL
Qty: 60 | Refills: 0 | Status: ACTIVE | COMMUNITY
Start: 1900-01-01 | End: 1900-01-01

## 2021-08-18 NOTE — HEALTH RISK ASSESSMENT
[Yes] : Yes [2 - 4 times a month (2 pts)] : 2-4 times a month (2 points) [1 or 2 (0 pts)] : 1 or 2 (0 points) [Never (0 pts)] : Never (0 points) [No] : In the past 12 months have you used drugs other than those required for medical reasons? No [0] : 2) Feeling down, depressed, or hopeless: Not at all (0) [PHQ-2 Negative - No further assessment needed] : PHQ-2 Negative - No further assessment needed [Patient reported mammogram was normal] : Patient reported mammogram was normal [MammogramDate] : 7/20 [] : No [Audit-CScore] : 2 [[id number]] : 0

## 2021-08-18 NOTE — PHYSICAL EXAM
[No Acute Distress] : no acute distress [Well Nourished] : well nourished [Well Developed] : well developed [Well-Appearing] : well-appearing [Normal Voice/Communication] : normal voice/communication [Normal Sclera/Conjunctiva] : normal sclera/conjunctiva [PERRL] : pupils equal round and reactive to light [EOMI] : extraocular movements intact [Normal Outer Ear/Nose] : the outer ears and nose were normal in appearance [Normal Oropharynx] : the oropharynx was normal [No JVD] : no jugular venous distention [Supple] : supple [No Lymphadenopathy] : no lymphadenopathy [Thyroid Normal, No Nodules] : the thyroid was normal and there were no nodules present [No Respiratory Distress] : no respiratory distress  [No Accessory Muscle Use] : no accessory muscle use [Clear to Auscultation] : lungs were clear to auscultation bilaterally [Normal Rate] : normal rate  [Regular Rhythm] : with a regular rhythm [Normal S1, S2] : normal S1 and S2 [No Murmur] : no murmur heard [No Carotid Bruits] : no carotid bruits [No Abdominal Bruit] : a ~M bruit was not heard ~T in the abdomen [Pedal Pulses Present] : the pedal pulses are present [No Edema] : there was no peripheral edema [No Palpable Aorta] : no palpable aorta [Soft] : abdomen soft [Non Tender] : non-tender [Non-distended] : non-distended [No Masses] : no abdominal mass palpated [No HSM] : no HSM [Normal Bowel Sounds] : normal bowel sounds [Normal Posterior Cervical Nodes] : no posterior cervical lymphadenopathy [Normal Anterior Cervical Nodes] : no anterior cervical lymphadenopathy [No CVA Tenderness] : no CVA  tenderness [No Spinal Tenderness] : no spinal tenderness [No Joint Swelling] : no joint swelling [Grossly Normal Strength/Tone] : grossly normal strength/tone [Coordination Grossly Intact] : coordination grossly intact [No Focal Deficits] : no focal deficits [Normal Gait] : normal gait [Speech Grossly Normal] : speech grossly normal [Memory Grossly Normal] : memory grossly normal [Normal Affect] : the affect was normal [Alert and Oriented x3] : oriented to person, place, and time [Normal Mood] : the mood was normal [Normal Insight/Judgement] : insight and judgment were intact

## 2021-08-18 NOTE — REVIEW OF SYSTEMS
[Fatigue] : fatigue [Dyspnea on Exertion] : dyspnea on exertion [Negative] : Heme/Lymph [Fever] : no fever [Chills] : no chills [Hot Flashes] : no hot flashes [Night Sweats] : no night sweats [Recent Change In Weight] : ~T no recent weight change [Shortness Of Breath] : no shortness of breath [Wheezing] : no wheezing [Cough] : no cough [Joint Pain] : no joint pain [Joint Stiffness] : no joint stiffness [Joint Swelling] : no joint swelling [Muscle Weakness] : no muscle weakness [Muscle Pain] : no muscle pain [Back Pain] : no back pain [Skin Rash] : no skin rash [Headache] : no headache [Dizziness] : no dizziness [Fainting] : no fainting [Confusion] : no confusion [Memory Loss] : no memory loss [Unsteady Walking] : no ataxia [FreeTextEntry9] : leg pains [de-identified] : numbness

## 2021-08-18 NOTE — HISTORY OF PRESENT ILLNESS
[FreeTextEntry1] : adhd, bradycardia, ar, aches\par back pain [de-identified] : 55 y/o female with a hx of adhd, ar, lyme s/p rx, s/p admission 5/19 with dizziness and bradycardia. Had cardiac w/u including cath. Had followed up with EPS - bradycardia thought to be secondary to vagal tone. Runs regularly. \par did not see rheum. \par Fatigue and feeling feeling winded have been improved.  Has started exercising again.  \par The back and chest pains have improved.  Breathing has been a lot better.  \par No noted numbness or weakness. \par Leg pains have been better \par s/p PT for the MCL - has been good.  reports that the knees sometimes give out.  \par Adderall has been on hold - Has appt with the doctor to go back on the rx.  Has been having sx with the focusing.\par \par tet ~ 10 years ago\par flu - gets\par \par gyn - follows\par mammo - due - to get in Oct in [place]\par colon - 2018

## 2021-09-19 ENCOUNTER — EMERGENCY (EMERGENCY)
Facility: HOSPITAL | Age: 56
LOS: 1 days | Discharge: HOME | End: 2021-09-19
Payer: COMMERCIAL

## 2021-09-19 VITALS
HEART RATE: 60 BPM | WEIGHT: 169.98 LBS | RESPIRATION RATE: 19 BRPM | OXYGEN SATURATION: 97 % | SYSTOLIC BLOOD PRESSURE: 116 MMHG | TEMPERATURE: 98 F | HEIGHT: 64 IN | DIASTOLIC BLOOD PRESSURE: 71 MMHG

## 2021-09-19 VITALS
SYSTOLIC BLOOD PRESSURE: 100 MMHG | DIASTOLIC BLOOD PRESSURE: 64 MMHG | OXYGEN SATURATION: 99 % | HEART RATE: 73 BPM | RESPIRATION RATE: 16 BRPM

## 2021-09-19 DIAGNOSIS — M25.561 PAIN IN RIGHT KNEE: ICD-10-CM

## 2021-09-19 DIAGNOSIS — Z98.51 TUBAL LIGATION STATUS: Chronic | ICD-10-CM
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
## 2021-09-19 DIAGNOSIS — Z98.51 TUBAL LIGATION STATUS: ICD-10-CM

## 2021-09-19 DIAGNOSIS — Z87.891 PERSONAL HISTORY OF NICOTINE DEPENDENCE: ICD-10-CM

## 2021-09-19 DIAGNOSIS — Z88.4 ALLERGY STATUS TO ANESTHETIC AGENT: ICD-10-CM

## 2021-09-19 DIAGNOSIS — J30.2 OTHER SEASONAL ALLERGIC RHINITIS: ICD-10-CM

## 2021-09-19 DIAGNOSIS — Z90.89 ACQUIRED ABSENCE OF OTHER ORGANS: ICD-10-CM

## 2021-09-19 DIAGNOSIS — M25.562 PAIN IN LEFT KNEE: ICD-10-CM

## 2021-09-19 DIAGNOSIS — M79.10 MYALGIA, UNSPECIFIED SITE: ICD-10-CM

## 2021-09-19 DIAGNOSIS — Z90.89 ACQUIRED ABSENCE OF OTHER ORGANS: Chronic | ICD-10-CM

## 2021-09-19 DIAGNOSIS — F41.9 ANXIETY DISORDER, UNSPECIFIED: ICD-10-CM

## 2021-09-19 DIAGNOSIS — Z86.79 PERSONAL HISTORY OF OTHER DISEASES OF THE CIRCULATORY SYSTEM: ICD-10-CM

## 2021-09-19 DIAGNOSIS — F98.8 OTHER SPECIFIED BEHAVIORAL AND EMOTIONAL DISORDERS WITH ONSET USUALLY OCCURRING IN CHILDHOOD AND ADOLESCENCE: ICD-10-CM

## 2021-09-19 PROCEDURE — 73562 X-RAY EXAM OF KNEE 3: CPT | Mod: 26,50

## 2021-09-19 PROCEDURE — 99284 EMERGENCY DEPT VISIT MOD MDM: CPT

## 2021-09-19 RX ORDER — KETOROLAC TROMETHAMINE 30 MG/ML
30 SYRINGE (ML) INJECTION ONCE
Refills: 0 | Status: DISCONTINUED | OUTPATIENT
Start: 2021-09-19 | End: 2021-09-19

## 2021-09-19 RX ORDER — ACETAMINOPHEN 500 MG
650 TABLET ORAL ONCE
Refills: 0 | Status: COMPLETED | OUTPATIENT
Start: 2021-09-19 | End: 2021-09-19

## 2021-09-19 RX ADMIN — Medication 30 MILLIGRAM(S): at 17:21

## 2021-09-19 RX ADMIN — Medication 650 MILLIGRAM(S): at 17:21

## 2021-09-19 NOTE — ED PROVIDER NOTE - PATIENT PORTAL LINK FT
You can access the FollowMyHealth Patient Portal offered by [facility] by registering at the following website: [email or web address]. By joining [employer]’s FollowMyHealth portal, you will also be able to view your health information using other applications (apps) compatible with our system.
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
## 2021-09-19 NOTE — ED ADULT NURSE NOTE - OBJECTIVE STATEMENT
BIBA- fell on both knees onto concrete at [facility]. [facility] Sarah

## 2021-09-19 NOTE — ED PROVIDER NOTE - NSFOLLOWUPINSTRUCTIONS_ED_ALL_ED_FT
Today you were seen in the ER for bilateral knee pain.     Take Motrin 600 mg every 8 hours as needed for moderate pain-- take with food.    Take Tylenol 650mg (Two 325 mg pills) every 4-6 hours as needed for pain.    Rest, Ice, Elevate injured area, use ace wraps for compression.     Follow-up with Orthopedics within 48-72 hours.     Return to Emergency room for any worsening or persistent pain, weakness, numbness, fever, color change to extremity, or any other concerning symptoms.    Advance activity as tolerated.     Continue all previously prescribed medications as directed unless otherwise instructed.     Follow up with your primary care physician in 48-72 hours- bring copies of your results.

## 2021-09-19 NOTE — ED ADULT NURSE NOTE - NSICDXPASTMEDICALHX_GEN_ALL_CORE_FT
PAST MEDICAL HISTORY:  ADD (attention deficit disorder)     Anxiety     Fainting     Former smoker     MVP (mitral valve prolapse)     Seasonal allergies
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
## 2021-09-19 NOTE — ED PROVIDER NOTE - NS ED ROS FT
Constitutional: (-) Fever, (-) Chills  Skin: (-) Color changes, (-) Rashes, (-) Wounds  CV: (-) Chest pain  Resp: (-) Cough, (-) Shortness of breath  GI: (-) Abdominal pain, (-) Nausea, (-) Vomiting  : (-) Dysuria   MSK: (+) Myalgias, (-) Back pain  Neuro:  (-) Headache

## 2021-09-19 NOTE — ED PROVIDER NOTE - CLINICAL SUMMARY MEDICAL DECISION MAKING FREE TEXT BOX
56y Female with no sig PMHx presents to the ER for knee pain/injury. Reporting 7/10 bilateral knee pain, right worse than left. Able to ambulate with pain. Denies numbness, tingling, weakness, hitting head or LOC. Full ROM, ligaments stable, tenderness and swelling to right knee. Likely contusion, less like fracture. Will give meds, xrays, reassess. Plan to dc with PMD and ortho follow up.

## 2021-09-20 ENCOUNTER — NON-APPOINTMENT (OUTPATIENT)
Age: 56
End: 2021-09-20

## 2021-09-20 ENCOUNTER — APPOINTMENT (OUTPATIENT)
Dept: ORTHOPEDIC SURGERY | Facility: CLINIC | Age: 56
End: 2021-09-20
Payer: COMMERCIAL

## 2021-09-20 DIAGNOSIS — S80.01XA CONTUSION OF RIGHT KNEE, INITIAL ENCOUNTER: ICD-10-CM

## 2021-09-20 DIAGNOSIS — M25.562 PAIN IN RIGHT KNEE: ICD-10-CM

## 2021-09-20 DIAGNOSIS — M25.561 PAIN IN RIGHT KNEE: ICD-10-CM

## 2021-09-20 DIAGNOSIS — S80.02XA CONTUSION OF LEFT KNEE, INITIAL ENCOUNTER: ICD-10-CM

## 2021-09-20 PROCEDURE — 73564 X-RAY EXAM KNEE 4 OR MORE: CPT | Mod: LT

## 2021-09-20 PROCEDURE — 99203 OFFICE O/P NEW LOW 30 MIN: CPT

## 2021-09-20 RX ORDER — MELOXICAM 15 MG/1
15 TABLET ORAL
Qty: 14 | Refills: 0 | Status: COMPLETED | COMMUNITY
Start: 2021-07-08

## 2021-09-20 NOTE — HISTORY OF PRESENT ILLNESS
[de-identified] : 56 year old female presents for initial evaluation of b/l knee pain. Patient experienced a mechanical fall x 1 day ago while in the airport while returning from [place] she sustaining  trauma to the R knee after tripping over a duffle bag. She developed a large bruise over the right knee which has been bothersome and was sent for X rays at a near hospital  however, no imaging was available at the time. She would like to ensure no fracture of the knees are present as she is carrying for her sister with disabilities. Otherwise no reports of motor sensory pain, difficulty walking. No L knee discomfort.

## 2021-09-20 NOTE — ADDENDUM
[FreeTextEntry1] : This note was written by Tiffanie Cantu on 09/20/2021 acting as scribe for Dr.Giles Magi KENT

## 2021-09-20 NOTE — PHYSICAL EXAM
[de-identified] : General appearance: well nourished and hydrated, pleasant, alert and oriented x 3, cooperative.\par HEENT: Normocephalic, EOM intact, Nasal septum midline, Oral cavity clear, External auditory canal clear.\par Cardiovascular: no apparent abnormalities, no lower leg edema, no varicosities, pedal pulses are palpable.\par Lymphatics Lymph nodes: none palpated, Lymphedema: not present.\par Neurologic: sensation is normal, no muscle weakness in upper or lower extremities, patella tendon reflexes intact .\par Dermatologic no apparent skin lesions, moist, warm, no rash.\par Spine:cervical spine appears normal and moves freely, thoracic spine appears normal and moves freely, lumbosacral spine appears normal and moves freely.\par Gait: nonantalgic.\par \par Left knee\par Inspection: no effusion or erythema.\par Wounds: none.\par Alignment: normal.\par Palpation: no specific tenderness on palpation.\par ROM active (in degrees):0-140\par Ligamentous laxity: all ligaments appear stable,, negative ant. drawer test, negative post. drawer test, stable to varus stress test, stable to valgus stress test. negative Lachman's test, negative pivot shift test\par Meniscal Test: negative McMurrays, negative Leandro.\par Patellofemoral Alignment Test: Q angle-, normal.\par Muscle Test: good quad strength.\par \par Right knee\par Inspection: no effusion or erythema.\par Wounds: diffuse ecchymosis over the central knee. \par Alignment: normal.\par Palpation: mild  tenderness superior pole of the patella on palpation.\par ROM active (in degrees):0-140, \par Ligamentous laxity: all ligaments appear stable,, negative ant. drawer test, negative post. drawer test, stable to varus stress test, stable to valgus stress test. negative Lachman's test, negative pivot shift test\par Meniscal Test: negative McMurrays, negative Leandro.\par Patellofemoral Alignment Test: Q angle-, normal.\par Muscle Test: good quad strength.\par \par Left hip\par Inspection: No swelling or ecchymosis.\par Wounds: none.\par Palpation: non-tender.\par Stability: no instability.\par Strength: 5/5 all motor groups.\par ROM: no pain with FROM.\par Leg length: equal.\par \par Right hip\par Inspection: No swelling or ecchymosis.\par Wounds: none.\par Palpation: non-tender.\par Stability: no instability.\par Strength: 5/5 all motor groups.\par ROM: no pain with FROM.\par Leg length: equal.\par \par Left ankle\par Inspection: no erythema noted, no swelling noted.\par Palpation: no pain on palpation .\par ROM: FROM without crepitus.\par Muscle strength: 5/5.\par Stability: no instability noted.\par \par Right ankle\par Inspection: no erythema noted, no swelling noted.\par ROM: FROM without crepitus.\par Palpation: no pain on palpation .\par Muscle strength: 5/5.\par Stability: no instability noted.\par \par Left foot\par Inspection: no erythema\par ROM: full range of motion of all joints without pain or crepitus.\par Palpation:2 and 3rd toes w/slight tenderness over the PIP joint and mild swelling. \par Stability: no instability noted.\par \par Right foot\par Inspection: color, texture and turgor are normal.\par ROM: full range of motion of all joints without pain or crepitus.\par Palpation: no tenderness.\par Stability: no instability noted.\par \par Left shoulder\par Inspection: no muscle asymmetry, no atrophy.\par Palpation: no tenderness noted, ACJ non-tender.\par ROM: full active ROM, full passive ROM.\par Strength testing): anterior deltoid, supraspinatus, infraspinatus, subscapularis all 5/5.\par Stability test: ant. apprehension negative, post. apprehension negative, relocation test negative.\par Impingement Test: negative NEER.\par \par Right shoulder\par Inspection: no muscle asymmetry, no atrophy.\par Palpation: no tenderness noted, ACJ non-tender.\par ROM: full active ROM, full passive ROM.\par Strength testing): anterior deltoid, supraspinatus, infraspinatus, subscapularis all 5/5.\par Stability test: ant. apprehension negative, post. apprehension negative, relocation test negative.\par Impingement Test: negative NEER.\par Surgical Wounds: none.\par \par Left elbow\par Inspection: negative swelling.\par Wounds: none.\par Palpation: non-tender.\par ROM: full ROM.\par Strength: 5/5 all groups.\par Stability: no instability.\par Mass: none.\par \par Right elbow\par Inspection: negative swelling.\par Wounds: none.\par Palpation: non-tender.\par ROM: full ROM.\par Strength: 5/5 all groups.\par Stability: no instability.\par Mass: none.\par \par Left wrist\par Inspection: negative swelling.\par Wound: none.\par Palpation (bone): no tenderness.\par ROM: full ROM.\par Strength: full , good.\par \par Right wrist\par Inspection: negative swelling.\par Wound: none.\par Palpation (bone): no tenderness.\par ROM: full ROM.\par Strength: full , good.\par \par Left hand\par Inspection: no skin changes, normal appearance.\par Wounds: none.\par Strength: full , able to make full fist.\par Sensation: light touch intact all fingers and thumb.\par Vascular: good capillary refill < 3 seconds, all fingers and thumb.\par Mass: none.\par \par Right hand\par Inspection: no skin changes, normal appearance.\par Wounds: none.\par Strength: full , able to make full fist.\par Sensation: light touch intact all fingers and thumb.\par Vascular: good capillary refill < 3 seconds, all fingers and thumb.\par Mass: none.  [de-identified] : LEFT  knee xrays, standing AP/Lateral and Merchant films, and 45 degree PA standing view, taken at the office today show normal alignment, good joint space maintained, well centered patella \par \par Right knee xray merchant view taken at the office today demonstrates good joint space and a well centered patella.

## 2021-09-20 NOTE — DISCUSSION/SUMMARY
[de-identified] : Discussed at length the nature of the patient’s condition. Her R knee symptoms appear secondary to b/l knee contusions. Reviewed findings on X rays with no evidence of fracture. Suggested pt complete cryotherapy and cryotherapy and gradually resumption of activities f/u 3- 4 weeks if any residual symptoms are present.

## 2022-02-09 NOTE — PHYSICAL EXAM
Mom was pumping when this LC arrived. Mom's nipples were pointed downward and not centered in flange. Encouraged Mom to keep infant centered to prevent too much areola from being pulled into flange. Mom just expressed 15mL of colostrum. Praised Mom for efforts. Mom currently pumping with 27mm flanges. Bilateral breast noted with edema. Encouraged Mom to start icing breast in between feeding. Encouraged massage and heat prior to help prime the breasts. Encouraged pumping 8-12 times a day. Mom verbally understands the above and has no further questions at this time.    [de-identified] : Small area of thichkening superior vestible rt. ? scar? papilloma? or inner adalberto of lower lateral cartilage [Midline] : trachea located in midline position [Normal] : no rashes

## 2022-08-30 NOTE — PATIENT PROFILE ADULT - NSPROMEDSADMININFO_GEN_A_NUR
Discharge instructions given to pt, verbalized understanding.  Tolerating PO fluids.  IV removed.  No c/o pain.  Wheeled out to friend  per RN in no distress.  
no concerns

## 2023-01-13 NOTE — ED ADULT TRIAGE NOTE - BP NONINVASIVE DIASTOLIC (MM HG)
Department of Anesthesiology  Preprocedure Note       Name:  Manav Mohan   Age:  49 y.o.  :  1975                                          MRN:  [id number]         Date:  2023      Surgeon: Bernard Thurman):  Estela Lopez MD    Procedure: Procedure(s):  ESOPHAGOGASTRODUODENOSCOPY    Medications prior to admission:   Prior to Admission medications    Medication Sig Start Date End Date Taking?  Authorizing Provider   sucralfate (CARAFATE) 1 GM tablet TAKE 1 TABLET BY MOUTH FOUR TIMES A DAY  Patient taking differently: in the morning, at noon, and at bedtime 22   SE Crawford CNP   amitriptyline (ELAVIL) 50 MG tablet TAKE 1 TABLET BY MOUTH EVERY DAY AT NIGHT 22   ALEKSANDR Bonds   pantoprazole (PROTONIX) 40 MG tablet TAKE 1 TABLET BY MOUTH EVERY DAY BEFORE BREAKFAST 22   ALEKSANDR Bonds   Calcium Polycarbophil (FIBER-CAPS PO) Take 1 capsule by mouth 3 times daily    Historical Provider, MD   Multiple Vitamins-Minerals (HM MULTIVITAMIN ADULT GUMMY PO) Take 2 tablets by mouth daily    Historical Provider, MD   acetaminophen (TYLENOL) 325 MG tablet Take 650 mg by mouth every 6 hours as needed for Pain    Historical Provider, MD   Cyanocobalamin (B-12) 500 MCG TABS Take 500 mg by mouth daily    Historical Provider, MD   norethindrone-ethinyl estradiol (BALZIVA) 0.4-35 MG-MCG per tablet Take 1 tablet by mouth daily    Historical Provider, MD   diclofenac sodium 1 % GEL Apply 2 g topically 4 times daily as needed for Pain 19   SE Addison CNP   vitamin C (ASCORBIC ACID) 500 MG tablet Take 1,000 mg by mouth daily     Historical Provider, MD       Current medications:    Current Facility-Administered Medications   Medication Dose Route Frequency Provider Last Rate Last Admin    lactated ringers infusion   IntraVENous Once Estela Lpoez MD        lidocaine PF 1 % injection 0.1 mL  0.1 mL IntraDERmal Once PRN Estela Lopez MD        lactated ringers infusion   IntraVENous Continuous Addison Morin MD        sodium chloride flush 0.9 % injection 5-40 mL  5-40 mL IntraVENous 2 times per day Mary Kayase MD Mikel        sodium chloride flush 0.9 % injection 5-40 mL  5-40 mL IntraVENous PRN Mary Kayase MD Mikel        0.9 % sodium chloride infusion   IntraVENous PRN Addison Morin MD           Allergies:  No Known Allergies    Problem List:    Patient Active Problem List   Diagnosis Code    Irritable bowel syndrome with constipation K58.1    Gastroesophageal reflux disease K21.9    Anxiety F41.9    Chronic midline thoracic back pain M54.6, G89.29    Constipation K59.00    Alternating constipation and diarrhea R19.8    Abdominal bloating R14.0    Carpal tunnel syndrome of right wrist G56.01       Past Medical History:        Diagnosis Date    Anxiety 02/13/2018    Chronic midline thoracic back pain 02/13/2018    Gastroesophageal reflux disease 02/13/2018    Hypoglycemia     Irritable bowel syndrome with constipation 02/13/2018    Pap smear 09/2005       Past Surgical History:        Procedure Laterality Date    COLONOSCOPY N/A 9/28/2020    COLON W/ANES. (7:30) performed by Clarice Polanco MD at [address] [place] [address]  12/19/2018    lasix    WISDOM TOOTH EXTRACTION         Social History:    Social History     Tobacco Use    Smoking status: Never    Smokeless tobacco: Never   Substance Use Topics    Alcohol use: Yes     Comment: 0-2 drinks per week                                Counseling given: Not Answered      Vital Signs (Current):   Vitals:    01/10/23 1459 01/13/23 0754   BP:  120/71   Pulse:  75   Resp:  18   Temp:  96.8 °F (36 °C)   TempSrc:  Temporal   SpO2:  100%   Weight: 135 lb (61.2 kg) 135 lb (61.2 kg)   Height: 5' 1\" (1.549 m) 5' 1\" (1.549 m)                                              BP Readings from Last 3 Encounters:   01/13/23 120/71   08/15/22 110/70   06/27/22 116/68       NPO Status: Time of last liquid consumption: 2030                        Time of last solid consumption: 2000                        Date of last liquid consumption: 01/12/23                        Date of last solid food consumption: 01/12/23    BMI:   Wt Readings from Last 3 Encounters:   01/13/23 135 lb (61.2 kg)   08/15/22 138 lb 6.4 oz (62.8 kg)   06/27/22 138 lb (62.6 kg)     Body mass index is 25.51 kg/m². CBC:   Lab Results   Component Value Date/Time    WBC 5.4 06/27/2022 09:34 AM    RBC 3.82 06/27/2022 09:34 AM    HGB 12.7 06/27/2022 09:34 AM    HCT 36.2 06/27/2022 09:34 AM    MCV 94.7 06/27/2022 09:34 AM    RDW 12.9 06/27/2022 09:34 AM     06/27/2022 09:34 AM       CMP:   Lab Results   Component Value Date/Time     06/27/2022 09:34 AM    K 4.5 06/27/2022 09:34 AM     06/27/2022 09:34 AM    CO2 23 06/27/2022 09:34 AM    BUN 13 06/27/2022 09:34 AM    CREATININE 0.9 06/27/2022 09:34 AM    GFRAA >60 06/27/2022 09:34 AM    GFRAA >60 11/29/2011 09:26 AM    AGRATIO 1.5 06/27/2022 09:34 AM    LABGLOM >60 06/27/2022 09:34 AM    GLUCOSE 83 06/27/2022 09:34 AM    PROT 7.3 06/27/2022 09:34 AM    PROT 7.4 11/29/2011 09:26 AM    CALCIUM 9.2 06/27/2022 09:34 AM    BILITOT 0.4 06/27/2022 09:34 AM    ALKPHOS 61 06/27/2022 09:34 AM    AST 18 06/27/2022 09:34 AM    ALT 12 06/27/2022 09:34 AM       POC Tests: No results for input(s): POCGLU, POCNA, POCK, POCCL, POCBUN, POCHEMO, POCHCT in the last 72 hours.     Coags: No results found for: PROTIME, INR, APTT    HCG (If Applicable):   Lab Results   Component Value Date    PREGTESTUR Negative 01/13/2023        ABGs: No results found for: PHART, PO2ART, ABR2MKC, TEQ0ZUO, BEART, K0WRJPCE     Type & Screen (If Applicable):  No results found for: LABABO, LABRH    Drug/Infectious Status (If Applicable):  No results found for: HIV, HEPCAB    COVID-19 Screening (If Applicable):   Lab Results   Component Value Date/Time    COVID19 NOT DETECTED 09/22/2020 08:26 AM Anesthesia Evaluation  Patient summary reviewed and Nursing notes reviewed  Airway: Mallampati: II  TM distance: >3 FB   Neck ROM: full  Mouth opening: > = 3 FB   Dental: normal exam         Pulmonary:Negative Pulmonary ROS and normal exam                               Cardiovascular:Negative CV ROS                      Neuro/Psych:   (+) neuromuscular disease:,             GI/Hepatic/Renal:   (+) GERD:,           Endo/Other: Negative Endo/Other ROS                    Abdominal:             Vascular: negative vascular ROS. Other Findings:           Anesthesia Plan      MAC     ASA 2       Induction: intravenous. Anesthetic plan and risks discussed with patient. Plan discussed with CRNA.     Attending anesthesiologist reviewed and agrees with Preprocedure content                AVE Whitt MD   1/13/2023 73

## 2023-07-15 ENCOUNTER — NON-APPOINTMENT (OUTPATIENT)
Age: 58
End: 2023-07-15

## 2023-10-19 ENCOUNTER — RX ONLY (OUTPATIENT)
Age: 58
Setting detail: RX ONLY
End: 2023-10-19

## 2023-10-23 ENCOUNTER — APPOINTMENT (RX ONLY)
Dept: URBAN - METROPOLITAN AREA CLINIC 146 | Facility: CLINIC | Age: 58
Setting detail: DERMATOLOGY
End: 2023-10-23

## 2023-10-23 DIAGNOSIS — L81.4 OTHER MELANIN HYPERPIGMENTATION: ICD-10-CM

## 2023-10-23 DIAGNOSIS — D18.0 HEMANGIOMA: ICD-10-CM

## 2023-10-23 DIAGNOSIS — L82.1 OTHER SEBORRHEIC KERATOSIS: ICD-10-CM

## 2023-10-23 DIAGNOSIS — L57.8 OTHER SKIN CHANGES DUE TO CHRONIC EXPOSURE TO NONIONIZING RADIATION: ICD-10-CM

## 2023-10-23 DIAGNOSIS — Z12.83 ENCOUNTER FOR SCREENING FOR MALIGNANT NEOPLASM OF SKIN: ICD-10-CM

## 2023-10-23 DIAGNOSIS — L85.3 XEROSIS CUTIS: ICD-10-CM

## 2023-10-23 PROBLEM — D18.01 HEMANGIOMA OF SKIN AND SUBCUTANEOUS TISSUE: Status: ACTIVE | Noted: 2023-10-23

## 2023-10-23 PROCEDURE — 99203 OFFICE O/P NEW LOW 30 MIN: CPT

## 2023-10-23 PROCEDURE — ? COUNSELING

## 2023-10-23 ASSESSMENT — LOCATION DETAILED DESCRIPTION DERM
LOCATION DETAILED: RIGHT POSTERIOR SHOULDER
LOCATION DETAILED: RIGHT DISTAL DORSAL FOREARM
LOCATION DETAILED: RIGHT INFERIOR CENTRAL MALAR CHEEK
LOCATION DETAILED: RIGHT CLAVICULAR NECK
LOCATION DETAILED: RIGHT ANTERIOR SHOULDER
LOCATION DETAILED: RIGHT ELBOW
LOCATION DETAILED: LEFT PROXIMAL CALF
LOCATION DETAILED: LEFT POSTERIOR SHOULDER
LOCATION DETAILED: LEFT PROXIMAL PRETIBIAL REGION
LOCATION DETAILED: LEFT DISTAL DORSAL FOREARM
LOCATION DETAILED: LEFT CENTRAL MALAR CHEEK
LOCATION DETAILED: LEFT PROXIMAL DORSAL FOREARM
LOCATION DETAILED: RIGHT PROXIMAL PRETIBIAL REGION
LOCATION DETAILED: RIGHT PROXIMAL CALF

## 2023-10-23 ASSESSMENT — LOCATION SIMPLE DESCRIPTION DERM
LOCATION SIMPLE: RIGHT FOREARM
LOCATION SIMPLE: LEFT PRETIBIAL REGION
LOCATION SIMPLE: RIGHT SHOULDER
LOCATION SIMPLE: RIGHT PRETIBIAL REGION
LOCATION SIMPLE: RIGHT ELBOW
LOCATION SIMPLE: LEFT FOREARM
LOCATION SIMPLE: RIGHT ANTERIOR NECK
LOCATION SIMPLE: RIGHT CHEEK
LOCATION SIMPLE: RIGHT CALF
LOCATION SIMPLE: LEFT CALF
LOCATION SIMPLE: LEFT CHEEK
LOCATION SIMPLE: LEFT SHOULDER

## 2023-10-23 ASSESSMENT — LOCATION ZONE DERM
LOCATION ZONE: NECK
LOCATION ZONE: LEG
LOCATION ZONE: ARM
LOCATION ZONE: FACE

## 2024-03-21 NOTE — PATIENT PROFILE ADULT - SURGICAL SITE INCISION
Hemoglobin is stable. I have placed a referral to [facility]. Their office will be calling to schedule an outpatient appointment for further evaluation. In the interim, you can try some phenylephrine ointment such as Preparation H. Continue with the Anusol suppositories as before.    Please return with any new or worsening symptoms.   no

## 2024-04-01 ENCOUNTER — APPOINTMENT (RX ONLY)
Dept: URBAN - METROPOLITAN AREA CLINIC 146 | Facility: CLINIC | Age: 59
Setting detail: DERMATOLOGY
End: 2024-04-01

## 2024-04-01 DIAGNOSIS — L81.4 OTHER MELANIN HYPERPIGMENTATION: ICD-10-CM

## 2024-04-01 DIAGNOSIS — L85.3 XEROSIS CUTIS: ICD-10-CM

## 2024-04-01 DIAGNOSIS — D18.0 HEMANGIOMA: ICD-10-CM

## 2024-04-01 DIAGNOSIS — L57.8 OTHER SKIN CHANGES DUE TO CHRONIC EXPOSURE TO NONIONIZING RADIATION: ICD-10-CM

## 2024-04-01 DIAGNOSIS — L82.1 OTHER SEBORRHEIC KERATOSIS: ICD-10-CM

## 2024-04-01 DIAGNOSIS — Z12.83 ENCOUNTER FOR SCREENING FOR MALIGNANT NEOPLASM OF SKIN: ICD-10-CM

## 2024-04-01 PROBLEM — D18.01 HEMANGIOMA OF SKIN AND SUBCUTANEOUS TISSUE: Status: ACTIVE | Noted: 2024-04-01

## 2024-04-01 PROCEDURE — ? COUNSELING

## 2024-04-01 PROCEDURE — 99213 OFFICE O/P EST LOW 20 MIN: CPT

## 2024-04-01 ASSESSMENT — LOCATION DETAILED DESCRIPTION DERM
LOCATION DETAILED: RIGHT INFERIOR CENTRAL MALAR CHEEK
LOCATION DETAILED: LEFT PROXIMAL PRETIBIAL REGION
LOCATION DETAILED: LEFT DISTAL DORSAL FOREARM
LOCATION DETAILED: LEFT POSTERIOR SHOULDER
LOCATION DETAILED: RIGHT ANTERIOR SHOULDER
LOCATION DETAILED: RIGHT DISTAL DORSAL FOREARM
LOCATION DETAILED: LEFT PROXIMAL CALF
LOCATION DETAILED: RIGHT CLAVICULAR NECK
LOCATION DETAILED: RIGHT PROXIMAL PRETIBIAL REGION
LOCATION DETAILED: RIGHT PROXIMAL CALF
LOCATION DETAILED: LEFT CENTRAL MALAR CHEEK
LOCATION DETAILED: RIGHT POSTERIOR SHOULDER
LOCATION DETAILED: LEFT PROXIMAL DORSAL FOREARM
LOCATION DETAILED: RIGHT ELBOW

## 2024-04-01 ASSESSMENT — LOCATION SIMPLE DESCRIPTION DERM
LOCATION SIMPLE: RIGHT SHOULDER
LOCATION SIMPLE: LEFT FOREARM
LOCATION SIMPLE: LEFT CHEEK
LOCATION SIMPLE: LEFT SHOULDER
LOCATION SIMPLE: RIGHT ELBOW
LOCATION SIMPLE: LEFT CALF
LOCATION SIMPLE: LEFT PRETIBIAL REGION
LOCATION SIMPLE: RIGHT CALF
LOCATION SIMPLE: RIGHT ANTERIOR NECK
LOCATION SIMPLE: RIGHT FOREARM
LOCATION SIMPLE: RIGHT CHEEK
LOCATION SIMPLE: RIGHT PRETIBIAL REGION

## 2024-04-01 ASSESSMENT — LOCATION ZONE DERM
LOCATION ZONE: NECK
LOCATION ZONE: ARM
LOCATION ZONE: LEG
LOCATION ZONE: FACE

## 2024-11-01 NOTE — ED ADULT NURSE NOTE - CADM POA PRESS ULCER
[FreeTextEntry1] :  # [id number]  Pt is here with reports of pain to left eye and left side of head that started ~ 1 month ago . Was in ED 2x. CT head was negative.  The pain is not as severe now as it was in the ED but it is constant - 1-2/10 .  Unsure of trigger .  PMH " depression  Denies pregnancy or breastfeeding  denies family hx of migraine  Sleeps well at nigh ~9 hours  eating well, staying hydrated  Denies smoking , denies ETOH 1 cup of coffee / day  has 1 child - 5 year old son   Employed as a house keeper.   [Headache] : headache [Aura] : no aura [Dizziness] : dizziness [Nausea] : no nausea [Vomiting] : no Vomiting [Photophobia] : photophobia [Phonophobia] : no phonophobia [Neck Pain] : neck pain [Scotoma] : no scotoma [Weakness] : no weakness [Scalp Tenderness] : scalp tenderness [Daily] : daily [> 4 hours] : > 4 hours No

## 2025-07-08 NOTE — ED ADULT NURSE NOTE - ALCOHOL PRE SCREEN (AUDIT - C)
Quality 130: Documentation Of Current Medications In The Medical Record: Current Medications Documented Quality 487: Screening For Social Drivers Of Health: Patient reason for not screening for food insecurity, housing instability, transportation needs, utility difficulties, and interpersonal safety (e.g., patient declined or other patient reasons) Quality 431: Preventive Care And Screening: Unhealthy Alcohol Use - Screening: Patient not identified as an unhealthy alcohol user when screened for unhealthy alcohol use using a systematic screening method Quality 48: Urinary Incontinence: Assessment Of Presence Or Absence Of Urinary Incontinence In Women Aged 65 Years And Older: Absence of Urinary Incontinence Assessed Quality 155 Part A: Falls: Risk Assessment: Falls risk assessment completed and documented in the past 12 months. Quality 226: Preventive Care And Screening: Tobacco Use: Screening And Cessation Intervention: Patient screened for tobacco use and is an ex/non-smoker Quality 155 Part B (Denominator): Falls: Risk Screening: No documentation of falls status Quality 47: Advance Care Plan: Advance Care Planning discussed and documented; advance care plan or surrogate decision maker documented in the medical record. Statement Selected Quality 50: Urinary Incontinence: Plan Of Care For Urinary Incontinence In Women Aged 65 Years And Older: Urinary incontinence plan of care not documented, reason not otherwise specified Detail Level: Simple